# Patient Record
Sex: MALE | Race: WHITE | Employment: OTHER | URBAN - METROPOLITAN AREA
[De-identification: names, ages, dates, MRNs, and addresses within clinical notes are randomized per-mention and may not be internally consistent; named-entity substitution may affect disease eponyms.]

---

## 2021-03-18 ENCOUNTER — TRANSCRIBE ORDER (OUTPATIENT)
Dept: REGISTRATION | Age: 81
End: 2021-03-18

## 2021-03-18 DIAGNOSIS — Z01.812 PRE-PROCEDURE LAB EXAM: Primary | ICD-10-CM

## 2021-03-25 ENCOUNTER — HOSPITAL ENCOUNTER (OUTPATIENT)
Dept: PREADMISSION TESTING | Age: 81
Discharge: HOME | End: 2021-03-25
Payer: MEDICARE

## 2021-03-25 VITALS
WEIGHT: 189.38 LBS | HEART RATE: 51 BPM | SYSTOLIC BLOOD PRESSURE: 163 MMHG | DIASTOLIC BLOOD PRESSURE: 89 MMHG | TEMPERATURE: 97.5 F | BODY MASS INDEX: 25.1 KG/M2 | HEIGHT: 73 IN

## 2021-03-25 LAB
ABO + RH BLD: NORMAL
ANION GAP SERPL CALC-SCNC: 5 MMOL/L (ref 5–15)
APPEARANCE UR: CLEAR
BACTERIA URNS QL MICRO: NEGATIVE /HPF
BILIRUB UR QL: NEGATIVE
BLOOD GROUP ANTIBODIES SERPL: NORMAL
BUN SERPL-MCNC: 25 MG/DL (ref 6–20)
BUN/CREAT SERPL: 19 (ref 12–20)
CALCIUM SERPL-MCNC: 9 MG/DL (ref 8.5–10.1)
CHLORIDE SERPL-SCNC: 106 MMOL/L (ref 97–108)
CO2 SERPL-SCNC: 28 MMOL/L (ref 21–32)
COLOR UR: NORMAL
CREAT SERPL-MCNC: 1.3 MG/DL (ref 0.7–1.3)
EPITH CASTS URNS QL MICRO: NORMAL /LPF
ERYTHROCYTE [DISTWIDTH] IN BLOOD BY AUTOMATED COUNT: 13 % (ref 11.5–14.5)
EST. AVERAGE GLUCOSE BLD GHB EST-MCNC: 108 MG/DL
GLUCOSE SERPL-MCNC: 99 MG/DL (ref 65–100)
GLUCOSE UR STRIP.AUTO-MCNC: NEGATIVE MG/DL
HBA1C MFR BLD: 5.4 % (ref 4–5.6)
HCT VFR BLD AUTO: 44.8 % (ref 36.6–50.3)
HGB BLD-MCNC: 14.5 G/DL (ref 12.1–17)
HGB UR QL STRIP: NEGATIVE
HYALINE CASTS URNS QL MICRO: NORMAL /LPF (ref 0–5)
INR PPP: 1 (ref 0.9–1.1)
KETONES UR QL STRIP.AUTO: NEGATIVE MG/DL
LEUKOCYTE ESTERASE UR QL STRIP.AUTO: NEGATIVE
MCH RBC QN AUTO: 30.2 PG (ref 26–34)
MCHC RBC AUTO-ENTMCNC: 32.4 G/DL (ref 30–36.5)
MCV RBC AUTO: 93.3 FL (ref 80–99)
NITRITE UR QL STRIP.AUTO: NEGATIVE
NRBC # BLD: 0 K/UL (ref 0–0.01)
NRBC BLD-RTO: 0 PER 100 WBC
PH UR STRIP: 5 [PH] (ref 5–8)
PLATELET # BLD AUTO: 241 K/UL (ref 150–400)
PMV BLD AUTO: 10 FL (ref 8.9–12.9)
POTASSIUM SERPL-SCNC: 4.4 MMOL/L (ref 3.5–5.1)
PROT UR STRIP-MCNC: NEGATIVE MG/DL
PROTHROMBIN TIME: 10.6 SEC (ref 9–11.1)
RBC # BLD AUTO: 4.8 M/UL (ref 4.1–5.7)
RBC #/AREA URNS HPF: NORMAL /HPF (ref 0–5)
SODIUM SERPL-SCNC: 139 MMOL/L (ref 136–145)
SP GR UR REFRACTOMETRY: 1.02 (ref 1–1.03)
SPECIMEN EXP DATE BLD: NORMAL
UA: UC IF INDICATED,UAUC: NORMAL
UROBILINOGEN UR QL STRIP.AUTO: 0.2 EU/DL (ref 0.2–1)
WBC # BLD AUTO: 4.4 K/UL (ref 4.1–11.1)
WBC URNS QL MICRO: NORMAL /HPF (ref 0–4)

## 2021-03-25 PROCEDURE — 85027 COMPLETE CBC AUTOMATED: CPT

## 2021-03-25 PROCEDURE — 36415 COLL VENOUS BLD VENIPUNCTURE: CPT

## 2021-03-25 PROCEDURE — 80048 BASIC METABOLIC PNL TOTAL CA: CPT

## 2021-03-25 PROCEDURE — 83036 HEMOGLOBIN GLYCOSYLATED A1C: CPT

## 2021-03-25 PROCEDURE — 81001 URINALYSIS AUTO W/SCOPE: CPT

## 2021-03-25 PROCEDURE — 85610 PROTHROMBIN TIME: CPT

## 2021-03-25 PROCEDURE — 86901 BLOOD TYPING SEROLOGIC RH(D): CPT

## 2021-03-25 RX ORDER — ROSUVASTATIN CALCIUM 20 MG/1
20 TABLET, COATED ORAL DAILY
COMMUNITY

## 2021-03-25 RX ORDER — AMLODIPINE BESYLATE 5 MG/1
5 TABLET ORAL DAILY
COMMUNITY

## 2021-03-25 NOTE — PERIOP NOTES
PATIENT MADE AWARE OF NEED FOR COVID-19 TESTING WITHIN 96 HOURS OF SURGERY. PATIENT INSTRUCTED TO EXPECT A CALL TO SCHEDULE APPT FOR TEST. PATIENT INSTRUCTED TO SELF QUARANTINE BETWEEN TESTING AND ARRIVAL TIME DAY OF SURGERY. Preoperative instructions reviewed with patient. Patient given two bottles of CHG soap. Instructions(reviewed/to be reviewed in class) on use of CHG soap. Patient given SSI infection FAQS sheet, as well as a  MRSA/MSSA treatment instruction sheet  With an explanation to patient that they will be notified if treatment instructions need to be initiated. Patient was given the opportunity to ask questions on the information provided.

## 2021-03-26 LAB
BACTERIA SPEC CULT: NORMAL
BACTERIA SPEC CULT: NORMAL
SERVICE CMNT-IMP: NORMAL

## 2021-03-26 NOTE — PERIOP NOTES
PAT Nurse Practitioner   Pre-Operative Chart Review/Assessment:-ORTHOPEDIC                Patient Name:  Yanet Jay                                                           Age:   [de-identified] y.o.    :  1940     Today's Date:  3/29/2021     Date of PAT:   3/25/2021      Date of Surgery:    2021      Procedure(s):  Left Total Hip Arthroplasty     Surgeon:   Dr. Gonzalo Junior                       PLAN:      1)  Medical Clearance:  Dr. Fortunato Faye      2)  Cardiac Clearance:  Pt followed by Dr. Camille Duffy. LOV 3/19/21. Clearance obtained. OV note and EKG on chart. 3)  Diabetic Treatment Consult:  Not indicated. A1c-5.4      4)  Sleep Apnea evaluation:   Not indicated.  ROBINSON Score 3.       5) Treatment for MRSA/Staph Aureus:  Neg      6) Additional Concerns:  Former smoker, EtOH: 14 drinks/wk, HTN, CAD s/p stent, [place]                Vital Signs:         Vitals:    21 1203   BP: (!) 163/89   Pulse: (!) 51   Temp: 97.5 °F (36.4 °C)   Weight: 85.9 kg (189 lb 6 oz)   Height: 6' 1\" (1.854 m)            ____________________________________________  PAST MEDICAL HISTORY  Past Medical History:   Diagnosis Date    Allergic rhinitis, cause unspecified 3/31/2010    ASCVD (arteriosclerotic cardiovascular disease) 3/31/2010    CAD (coronary artery disease)     Cancer ([facility] [address]     BASAL CELL ON RIGHT EAR    Diverticulosis of colon (without mention of hemorrhage) 3/31/2010    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    Essential hypertension, benign 3/31/2010    Hypercholesterolemia     Other and unspecified hyperlipidemia 3/31/2010    Personal history of colonic polyps 3/31/2010    Personal history of MI (myocardial infarction) 3/31/2010    Stented coronary artery 3/31/2010      ____________________________________________  PAST SURGICAL HISTORY  Past Surgical History:   Procedure Laterality Date    ENDOSCOPY, COLON, DIAGNOSTIC  ,    HX APPENDECTOMY      HX CATARACT REMOVAL Bilateral     HX CORONARY STENT PLACEMENT      HX HEENT      TEETH EXTRACTIONS    HX TONSILLECTOMY      AS A CHILD      ____________________________________________  HOME MEDICATIONS  Current Outpatient Medications   Medication Sig    amLODIPine (NORVASC) 5 mg tablet Take 5 mg by mouth daily.  rosuvastatin (CRESTOR) 20 mg tablet Take 20 mg by mouth daily.  metoprolol-XL (TOPROL XL) 50 mg XL tablet Take 1 Tab by mouth daily.  lisinopril (PRINIVIL, ZESTRIL) 40 mg tablet Take 1 Tab by mouth daily.  hydrochlorothiazide (MICROZIDE) 12.5 mg capsule Take 1 Cap by mouth daily.  tadalafil (CIALIS) 10 mg tablet Take 5 mg by mouth as needed.  aspirin (ASPIRIN) 325 mg tablet Take 325 mg by mouth daily.  nitroglycerin (NITROSTAT) 0.4 mg SL tablet by SubLINGual route every five (5) minutes as needed. No current facility-administered medications for this encounter.       ____________________________________________  ALLERGIES  No Known Allergies   ____________________________________________  SOCIAL HISTORY  Social History     Tobacco Use    Smoking status: Former Smoker     Packs/day: 1.50     Years: 22.00     Pack years: 33.00     Quit date: 1980     Years since quittin.8    Smokeless tobacco: Never Used   Substance Use Topics    Alcohol use:  Yes     Alcohol/week: 14.0 standard drinks     Types: 14 Shots of liquor per week      ____________________________________________        Labs:     Hospital Outpatient Visit on 2021   Component Date Value Ref Range Status    Sodium 2021 139  136 - 145 mmol/L Final    Potassium 2021 4.4  3.5 - 5.1 mmol/L Final    Chloride 2021 106  97 - 108 mmol/L Final    CO2 2021 28  21 - 32 mmol/L Final    Anion gap 2021 5  5 - 15 mmol/L Final    Glucose 2021 99  65 - 100 mg/dL Final    BUN 2021 25* 6 - 20 MG/DL Final    Creatinine 2021 1.30  0.70 - 1.30 MG/DL Final    BUN/Creatinine ratio 2021 19  12 - 20   Final    GFR est AA 03/25/2021 >60  >60 ml/min/1.73m2 Final    GFR est non-AA 03/25/2021 53* >60 ml/min/1.73m2 Final    Estimated GFR is calculated using the IDMS-traceable Modification of Diet in Renal Disease (MDRD) Study equation, reported for both  Americans (GFRAA) and non- Americans (GFRNA), and normalized to 1.73m2 body surface area. The physician must decide which value applies to the patient.  Calcium 03/25/2021 9.0  8.5 - 10.1 MG/DL Final    WBC 03/25/2021 4.4  4.1 - 11.1 K/uL Final    RBC 03/25/2021 4.80  4. 10 - 5.70 M/uL Final    HGB 03/25/2021 14.5  12.1 - 17.0 g/dL Final    HCT 03/25/2021 44.8  36.6 - 50.3 % Final    MCV 03/25/2021 93.3  80.0 - 99.0 FL Final    MCH 03/25/2021 30.2  26.0 - 34.0 PG Final    MCHC 03/25/2021 32.4  30.0 - 36.5 g/dL Final    RDW 03/25/2021 13.0  11.5 - 14.5 % Final    PLATELET 96/55/2282 628  150 - 400 K/uL Final    MPV 03/25/2021 10.0  8.9 - 12.9 FL Final    NRBC 03/25/2021 0.0  0  WBC Final    ABSOLUTE NRBC 03/25/2021 0.00  0.00 - 0.01 K/uL Final    Crossmatch Expiration 03/25/2021 04/04/2021,2359   Final    ABO/Rh(D) 03/25/2021 AB POSITIVE   Final    Antibody screen 03/25/2021 NEG   Final    INR 03/25/2021 1.0  0.9 - 1.1   Final    A single therapeutic range for Vit K antagonists may not be optimal for all indications - see June, 2008 issue of Chest, American College of Chest Physicians Evidence-Based Clinical Practice Guidelines, 8th Edition.     Prothrombin time 03/25/2021 10.6  9.0 - 11.1 sec Final    Color 03/25/2021 YELLOW/STRAW    Final    Color Reference Range: Straw, Yellow or Dark Yellow    Appearance 03/25/2021 CLEAR  CLEAR   Final    Specific gravity 03/25/2021 1.016  1.003 - 1.030   Final    pH (UA) 03/25/2021 5.0  5.0 - 8.0   Final    Protein 03/25/2021 Negative  NEG mg/dL Final    Glucose 03/25/2021 Negative  NEG mg/dL Final    Ketone 03/25/2021 Negative  NEG mg/dL Final    Bilirubin 03/25/2021 Negative  NEG   Final    Blood 03/25/2021 Negative  NEG   Final    Urobilinogen 03/25/2021 0.2  0.2 - 1.0 EU/dL Final    Nitrites 03/25/2021 Negative  NEG   Final    Leukocyte Esterase 03/25/2021 Negative  NEG   Final    UA:UC IF INDICATED 03/25/2021 CULTURE NOT INDICATED BY UA RESULT  CNI   Final    WBC 03/25/2021 0-4  0 - 4 /hpf Final    RBC 03/25/2021 0-5  0 - 5 /hpf Final    Epithelial cells 03/25/2021 FEW  FEW /lpf Final    Epithelial cell category consists of squamous cells and /or transitional urothelial cells. Renal tubular cells, if present, are separately identified as such.  Bacteria 03/25/2021 Negative  NEG /hpf Final    Hyaline cast 03/25/2021 0-2  0 - 5 /lpf Final    Hemoglobin A1c 03/25/2021 5.4  4.0 - 5.6 % Final    Comment: NEW METHOD  PLEASE NOTE NEW REFERENCE RANGE  (NOTE)  HbA1C Interpretive Ranges  <5.7              Normal  5.7 - 6.4         Consider Prediabetes  >6.5              Consider Diabetes      Est. average glucose 03/25/2021 108  mg/dL Final    Special Requests: 03/25/2021 NO SPECIAL REQUESTS    Final    Culture result: 03/25/2021 MRSA NOT PRESENT    Final       Skin:     Denies open wounds, cuts, sores, rashes or other areas of concern in PAT assessment.           Db Cao NP

## 2021-03-28 ENCOUNTER — HOSPITAL ENCOUNTER (OUTPATIENT)
Dept: PREADMISSION TESTING | Age: 81
Discharge: HOME | End: 2021-03-28
Payer: MEDICARE

## 2021-03-28 DIAGNOSIS — Z01.812 PRE-PROCEDURE LAB EXAM: ICD-10-CM

## 2021-03-28 PROCEDURE — U0003 INFECTIOUS AGENT DETECTION BY NUCLEIC ACID (DNA OR RNA); SEVERE ACUTE RESPIRATORY SYNDROME CORONAVIRUS 2 (SARS-COV-2) (CORONAVIRUS DISEASE [COVID-19]), AMPLIFIED PROBE TECHNIQUE, MAKING USE OF HIGH THROUGHPUT TECHNOLOGIES AS DESCRIBED BY CMS-2020-01-R: HCPCS

## 2021-03-29 LAB — SARS-COV-2, COV2NT: NOT DETECTED

## 2021-03-30 RX ORDER — ACETAMINOPHEN 500 MG
1000 TABLET ORAL ONCE
Status: CANCELLED | OUTPATIENT
Start: 2021-03-30 | End: 2021-03-30

## 2021-03-30 RX ORDER — CELECOXIB 200 MG/1
200 CAPSULE ORAL ONCE
Status: CANCELLED | OUTPATIENT
Start: 2021-03-30 | End: 2021-03-30

## 2021-04-01 ENCOUNTER — APPOINTMENT (OUTPATIENT)
Dept: GENERAL RADIOLOGY | Age: 81
End: 2021-04-01
Attending: PHYSICIAN ASSISTANT
Payer: MEDICARE

## 2021-04-01 ENCOUNTER — HOSPITAL ENCOUNTER (OUTPATIENT)
Age: 81
Discharge: HOME HEALTH | End: 2021-04-02
Attending: ORTHOPAEDIC SURGERY | Admitting: ORTHOPAEDIC SURGERY
Payer: MEDICARE

## 2021-04-01 ENCOUNTER — ANESTHESIA (OUTPATIENT)
Dept: SURGERY | Age: 81
End: 2021-04-01
Payer: MEDICARE

## 2021-04-01 ENCOUNTER — ANESTHESIA EVENT (OUTPATIENT)
Dept: SURGERY | Age: 81
End: 2021-04-01
Payer: MEDICARE

## 2021-04-01 DIAGNOSIS — M16.12 PRIMARY OSTEOARTHRITIS OF LEFT HIP: Primary | ICD-10-CM

## 2021-04-01 LAB
GLUCOSE BLD STRIP.AUTO-MCNC: 101 MG/DL (ref 65–100)
SERVICE CMNT-IMP: ABNORMAL

## 2021-04-01 PROCEDURE — 74011000250 HC RX REV CODE- 250: Performed by: PHYSICIAN ASSISTANT

## 2021-04-01 PROCEDURE — 77030031139 HC SUT VCRL2 J&J -A: Performed by: ORTHOPAEDIC SURGERY

## 2021-04-01 PROCEDURE — 76060000036 HC ANESTHESIA 2.5 TO 3 HR: Performed by: ORTHOPAEDIC SURGERY

## 2021-04-01 PROCEDURE — 77030003882 HC BIT DRL TWST BRSM -B: Performed by: ORTHOPAEDIC SURGERY

## 2021-04-01 PROCEDURE — 77030040361 HC SLV COMPR DVT MDII -B

## 2021-04-01 PROCEDURE — 77030040922 HC BLNKT HYPOTHRM STRY -A

## 2021-04-01 PROCEDURE — 74011000250 HC RX REV CODE- 250: Performed by: NURSE ANESTHETIST, CERTIFIED REGISTERED

## 2021-04-01 PROCEDURE — 77030013079 HC BLNKT BAIR HGGR 3M -A: Performed by: ANESTHESIOLOGY

## 2021-04-01 PROCEDURE — 2709999900 HC NON-CHARGEABLE SUPPLY: Performed by: ORTHOPAEDIC SURGERY

## 2021-04-01 PROCEDURE — 77030006822 HC BLD SAW SAG BRSM -B: Performed by: ORTHOPAEDIC SURGERY

## 2021-04-01 PROCEDURE — 74011250637 HC RX REV CODE- 250/637: Performed by: PHYSICIAN ASSISTANT

## 2021-04-01 PROCEDURE — 77030010507 HC ADH SKN DERMBND J&J -B: Performed by: ORTHOPAEDIC SURGERY

## 2021-04-01 PROCEDURE — 77030041397 HC DRSG PRIMASEAL AG MDII -B: Performed by: ORTHOPAEDIC SURGERY

## 2021-04-01 PROCEDURE — 77030005513 HC CATH URETH FOL11 MDII -B: Performed by: ORTHOPAEDIC SURGERY

## 2021-04-01 PROCEDURE — 77030002933 HC SUT MCRYL J&J -A: Performed by: ORTHOPAEDIC SURGERY

## 2021-04-01 PROCEDURE — 73501 X-RAY EXAM HIP UNI 1 VIEW: CPT

## 2021-04-01 PROCEDURE — 77030018673: Performed by: ORTHOPAEDIC SURGERY

## 2021-04-01 PROCEDURE — 82962 GLUCOSE BLOOD TEST: CPT

## 2021-04-01 PROCEDURE — 74011250637 HC RX REV CODE- 250/637: Performed by: ANESTHESIOLOGY

## 2021-04-01 PROCEDURE — 77030033067 HC SUT PDO STRATFX SPIR J&J -B: Performed by: ORTHOPAEDIC SURGERY

## 2021-04-01 PROCEDURE — 76010000171 HC OR TIME 2 TO 2.5 HR INTENSV-TIER 1: Performed by: ORTHOPAEDIC SURGERY

## 2021-04-01 PROCEDURE — C1776 JOINT DEVICE (IMPLANTABLE): HCPCS | Performed by: ORTHOPAEDIC SURGERY

## 2021-04-01 PROCEDURE — 74011250636 HC RX REV CODE- 250/636: Performed by: ANESTHESIOLOGY

## 2021-04-01 PROCEDURE — 76210000016 HC OR PH I REC 1 TO 1.5 HR: Performed by: ORTHOPAEDIC SURGERY

## 2021-04-01 PROCEDURE — 76210000020 HC REC RM PH II FIRST 0.5 HR: Performed by: ORTHOPAEDIC SURGERY

## 2021-04-01 PROCEDURE — 77030018723 HC ELCTRD BLD COVD -A: Performed by: ORTHOPAEDIC SURGERY

## 2021-04-01 PROCEDURE — 77030018547 HC SUT ETHBND1 J&J -B: Performed by: ORTHOPAEDIC SURGERY

## 2021-04-01 PROCEDURE — 74011250636 HC RX REV CODE- 250/636: Performed by: NURSE ANESTHETIST, CERTIFIED REGISTERED

## 2021-04-01 PROCEDURE — 74011000250 HC RX REV CODE- 250: Performed by: ORTHOPAEDIC SURGERY

## 2021-04-01 PROCEDURE — 77030018074 HC RTVR SUT ARTH4 S&N -B: Performed by: ORTHOPAEDIC SURGERY

## 2021-04-01 PROCEDURE — 74011250636 HC RX REV CODE- 250/636: Performed by: PHYSICIAN ASSISTANT

## 2021-04-01 PROCEDURE — 77030007866 HC KT SPN ANES BBMI -B: Performed by: ANESTHESIOLOGY

## 2021-04-01 DEVICE — TAPERFILL HIP STEM, LATERAL OFFSET, SIZE 14
Type: IMPLANTABLE DEVICE | Site: HIP | Status: FUNCTIONAL
Brand: DJO SURGICAL

## 2021-04-01 DEVICE — HEAD, FEMORAL, CERAMIC, BILOX DELTA, 40MM +4.0
Type: IMPLANTABLE DEVICE | Site: HIP | Status: FUNCTIONAL
Brand: DJO SURGICAL

## 2021-04-01 DEVICE — EMPOWR ACETABULAR, BONE SCREW, 30MM
Type: IMPLANTABLE DEVICE | Site: HIP | Status: FUNCTIONAL
Brand: DJO SURGICAL

## 2021-04-01 DEVICE — EMPOWR ACETABULAR SYSTEM, LINER, NEUTRAL, HXE+, 40I
Type: IMPLANTABLE DEVICE | Site: HIP | Status: FUNCTIONAL
Brand: DJO SURGICAL

## 2021-04-01 DEVICE — EMPOWR ACET SYSTEM, CUP, HEMISPHERICAL, CLUSTER HOLE, 60MM
Type: IMPLANTABLE DEVICE | Site: HIP | Status: FUNCTIONAL
Brand: DJO SURGICAL

## 2021-04-01 RX ORDER — PROPOFOL 10 MG/ML
INJECTION, EMULSION INTRAVENOUS
Status: DISCONTINUED | OUTPATIENT
Start: 2021-04-01 | End: 2021-04-01 | Stop reason: HOSPADM

## 2021-04-01 RX ORDER — SODIUM CHLORIDE 0.9 % (FLUSH) 0.9 %
5-40 SYRINGE (ML) INJECTION EVERY 8 HOURS
Status: DISCONTINUED | OUTPATIENT
Start: 2021-04-01 | End: 2021-04-01 | Stop reason: HOSPADM

## 2021-04-01 RX ORDER — SODIUM CHLORIDE, SODIUM LACTATE, POTASSIUM CHLORIDE, CALCIUM CHLORIDE 600; 310; 30; 20 MG/100ML; MG/100ML; MG/100ML; MG/100ML
INJECTION, SOLUTION INTRAVENOUS
Status: DISCONTINUED | OUTPATIENT
Start: 2021-04-01 | End: 2021-04-01 | Stop reason: HOSPADM

## 2021-04-01 RX ORDER — ACETAMINOPHEN 500 MG
500 TABLET ORAL EVERY 4 HOURS
Qty: 100 TAB | Refills: 0 | Status: SHIPPED
Start: 2021-04-01

## 2021-04-01 RX ORDER — TRANEXAMIC ACID 100 MG/ML
INJECTION, SOLUTION INTRAVENOUS AS NEEDED
Status: DISCONTINUED | OUTPATIENT
Start: 2021-04-01 | End: 2021-04-01 | Stop reason: HOSPADM

## 2021-04-01 RX ORDER — FENTANYL CITRATE 50 UG/ML
25 INJECTION, SOLUTION INTRAMUSCULAR; INTRAVENOUS
Status: DISCONTINUED | OUTPATIENT
Start: 2021-04-01 | End: 2021-04-01 | Stop reason: HOSPADM

## 2021-04-01 RX ORDER — AMOXICILLIN 250 MG
1 CAPSULE ORAL
Qty: 60 TAB | Refills: 0 | Status: SHIPPED | OUTPATIENT
Start: 2021-04-01

## 2021-04-01 RX ORDER — SODIUM CHLORIDE 9 MG/ML
25 INJECTION, SOLUTION INTRAVENOUS CONTINUOUS
Status: DISCONTINUED | OUTPATIENT
Start: 2021-04-01 | End: 2021-04-01 | Stop reason: HOSPADM

## 2021-04-01 RX ORDER — FENTANYL CITRATE 50 UG/ML
50 INJECTION, SOLUTION INTRAMUSCULAR; INTRAVENOUS AS NEEDED
Status: DISCONTINUED | OUTPATIENT
Start: 2021-04-01 | End: 2021-04-01 | Stop reason: HOSPADM

## 2021-04-01 RX ORDER — FACIAL-BODY WIPES
10 EACH TOPICAL DAILY PRN
Status: DISCONTINUED | OUTPATIENT
Start: 2021-04-03 | End: 2021-04-02 | Stop reason: HOSPADM

## 2021-04-01 RX ORDER — MIDAZOLAM HYDROCHLORIDE 1 MG/ML
1 INJECTION, SOLUTION INTRAMUSCULAR; INTRAVENOUS AS NEEDED
Status: DISCONTINUED | OUTPATIENT
Start: 2021-04-01 | End: 2021-04-01 | Stop reason: HOSPADM

## 2021-04-01 RX ORDER — SODIUM CHLORIDE 0.9 % (FLUSH) 0.9 %
5-40 SYRINGE (ML) INJECTION EVERY 8 HOURS
Status: DISCONTINUED | OUTPATIENT
Start: 2021-04-01 | End: 2021-04-02 | Stop reason: HOSPADM

## 2021-04-01 RX ORDER — NALOXONE HYDROCHLORIDE 0.4 MG/ML
0.4 INJECTION, SOLUTION INTRAMUSCULAR; INTRAVENOUS; SUBCUTANEOUS AS NEEDED
Status: DISCONTINUED | OUTPATIENT
Start: 2021-04-01 | End: 2021-04-02 | Stop reason: HOSPADM

## 2021-04-01 RX ORDER — MIDAZOLAM HYDROCHLORIDE 1 MG/ML
0.5 INJECTION, SOLUTION INTRAMUSCULAR; INTRAVENOUS
Status: DISCONTINUED | OUTPATIENT
Start: 2021-04-01 | End: 2021-04-01 | Stop reason: HOSPADM

## 2021-04-01 RX ORDER — OXYCODONE AND ACETAMINOPHEN 5; 325 MG/1; MG/1
1 TABLET ORAL AS NEEDED
Status: DISCONTINUED | OUTPATIENT
Start: 2021-04-01 | End: 2021-04-01 | Stop reason: HOSPADM

## 2021-04-01 RX ORDER — ONDANSETRON 2 MG/ML
4 INJECTION INTRAMUSCULAR; INTRAVENOUS
Status: DISCONTINUED | OUTPATIENT
Start: 2021-04-01 | End: 2021-04-02 | Stop reason: HOSPADM

## 2021-04-01 RX ORDER — ACETAMINOPHEN 500 MG
1000 TABLET ORAL ONCE
Status: DISCONTINUED | OUTPATIENT
Start: 2021-04-01 | End: 2021-04-01 | Stop reason: HOSPADM

## 2021-04-01 RX ORDER — ONDANSETRON 2 MG/ML
4 INJECTION INTRAMUSCULAR; INTRAVENOUS AS NEEDED
Status: DISCONTINUED | OUTPATIENT
Start: 2021-04-01 | End: 2021-04-01 | Stop reason: HOSPADM

## 2021-04-01 RX ORDER — EPHEDRINE SULFATE/0.9% NACL/PF 50 MG/5 ML
SYRINGE (ML) INTRAVENOUS AS NEEDED
Status: DISCONTINUED | OUTPATIENT
Start: 2021-04-01 | End: 2021-04-01 | Stop reason: HOSPADM

## 2021-04-01 RX ORDER — KETOROLAC TROMETHAMINE 30 MG/ML
15 INJECTION, SOLUTION INTRAMUSCULAR; INTRAVENOUS EVERY 6 HOURS
Status: COMPLETED | OUTPATIENT
Start: 2021-04-01 | End: 2021-04-02

## 2021-04-01 RX ORDER — OXYCODONE HYDROCHLORIDE 5 MG/1
2.5 TABLET ORAL
Status: DISCONTINUED | OUTPATIENT
Start: 2021-04-01 | End: 2021-04-02 | Stop reason: HOSPADM

## 2021-04-01 RX ORDER — LISINOPRIL 10 MG/1
40 TABLET ORAL DAILY
Status: DISCONTINUED | OUTPATIENT
Start: 2021-04-02 | End: 2021-04-02 | Stop reason: HOSPADM

## 2021-04-01 RX ORDER — ROSUVASTATIN CALCIUM 10 MG/1
20 TABLET, COATED ORAL DAILY
Status: DISCONTINUED | OUTPATIENT
Start: 2021-04-02 | End: 2021-04-02 | Stop reason: HOSPADM

## 2021-04-01 RX ORDER — ACETAMINOPHEN 325 MG/1
650 TABLET ORAL ONCE
Status: COMPLETED | OUTPATIENT
Start: 2021-04-01 | End: 2021-04-01

## 2021-04-01 RX ORDER — ASPIRIN 81 MG/1
81 TABLET ORAL EVERY 12 HOURS
Status: DISCONTINUED | OUTPATIENT
Start: 2021-04-01 | End: 2021-04-02 | Stop reason: HOSPADM

## 2021-04-01 RX ORDER — METOPROLOL SUCCINATE 50 MG/1
50 TABLET, EXTENDED RELEASE ORAL DAILY
Status: DISCONTINUED | OUTPATIENT
Start: 2021-04-02 | End: 2021-04-02 | Stop reason: HOSPADM

## 2021-04-01 RX ORDER — LIDOCAINE HYDROCHLORIDE 10 MG/ML
0.1 INJECTION, SOLUTION EPIDURAL; INFILTRATION; INTRACAUDAL; PERINEURAL AS NEEDED
Status: DISCONTINUED | OUTPATIENT
Start: 2021-04-01 | End: 2021-04-01 | Stop reason: HOSPADM

## 2021-04-01 RX ORDER — MORPHINE SULFATE 2 MG/ML
2 INJECTION, SOLUTION INTRAMUSCULAR; INTRAVENOUS
Status: DISCONTINUED | OUTPATIENT
Start: 2021-04-01 | End: 2021-04-01 | Stop reason: HOSPADM

## 2021-04-01 RX ORDER — HYDROMORPHONE HYDROCHLORIDE 1 MG/ML
0.5 INJECTION, SOLUTION INTRAMUSCULAR; INTRAVENOUS; SUBCUTANEOUS
Status: DISCONTINUED | OUTPATIENT
Start: 2021-04-01 | End: 2021-04-02 | Stop reason: HOSPADM

## 2021-04-01 RX ORDER — DIPHENHYDRAMINE HYDROCHLORIDE 50 MG/ML
12.5 INJECTION, SOLUTION INTRAMUSCULAR; INTRAVENOUS AS NEEDED
Status: DISCONTINUED | OUTPATIENT
Start: 2021-04-01 | End: 2021-04-01 | Stop reason: HOSPADM

## 2021-04-01 RX ORDER — AMLODIPINE BESYLATE 5 MG/1
5 TABLET ORAL DAILY
Status: DISCONTINUED | OUTPATIENT
Start: 2021-04-02 | End: 2021-04-02 | Stop reason: HOSPADM

## 2021-04-01 RX ORDER — ASPIRIN 81 MG/1
81 TABLET ORAL 2 TIMES DAILY
Qty: 60 TAB | Refills: 0 | Status: SHIPPED | OUTPATIENT
Start: 2021-04-01

## 2021-04-01 RX ORDER — POLYETHYLENE GLYCOL 3350 17 G/17G
17 POWDER, FOR SOLUTION ORAL DAILY
Status: DISCONTINUED | OUTPATIENT
Start: 2021-04-02 | End: 2021-04-02 | Stop reason: HOSPADM

## 2021-04-01 RX ORDER — SODIUM CHLORIDE 0.9 % (FLUSH) 0.9 %
5-40 SYRINGE (ML) INJECTION AS NEEDED
Status: DISCONTINUED | OUTPATIENT
Start: 2021-04-01 | End: 2021-04-01 | Stop reason: HOSPADM

## 2021-04-01 RX ORDER — SODIUM CHLORIDE, SODIUM LACTATE, POTASSIUM CHLORIDE, CALCIUM CHLORIDE 600; 310; 30; 20 MG/100ML; MG/100ML; MG/100ML; MG/100ML
125 INJECTION, SOLUTION INTRAVENOUS CONTINUOUS
Status: DISCONTINUED | OUTPATIENT
Start: 2021-04-01 | End: 2021-04-01 | Stop reason: HOSPADM

## 2021-04-01 RX ORDER — HYDROMORPHONE HYDROCHLORIDE 1 MG/ML
0.2 INJECTION, SOLUTION INTRAMUSCULAR; INTRAVENOUS; SUBCUTANEOUS
Status: DISCONTINUED | OUTPATIENT
Start: 2021-04-01 | End: 2021-04-01 | Stop reason: HOSPADM

## 2021-04-01 RX ORDER — PROPOFOL 10 MG/ML
INJECTION, EMULSION INTRAVENOUS AS NEEDED
Status: DISCONTINUED | OUTPATIENT
Start: 2021-04-01 | End: 2021-04-01 | Stop reason: HOSPADM

## 2021-04-01 RX ORDER — CELECOXIB 200 MG/1
200 CAPSULE ORAL ONCE
Status: DISCONTINUED | OUTPATIENT
Start: 2021-04-01 | End: 2021-04-01 | Stop reason: HOSPADM

## 2021-04-01 RX ORDER — MIDAZOLAM HYDROCHLORIDE 1 MG/ML
INJECTION, SOLUTION INTRAMUSCULAR; INTRAVENOUS AS NEEDED
Status: DISCONTINUED | OUTPATIENT
Start: 2021-04-01 | End: 2021-04-01 | Stop reason: HOSPADM

## 2021-04-01 RX ORDER — CEFAZOLIN SODIUM 1 G/3ML
INJECTION, POWDER, FOR SOLUTION INTRAMUSCULAR; INTRAVENOUS AS NEEDED
Status: DISCONTINUED | OUTPATIENT
Start: 2021-04-01 | End: 2021-04-01 | Stop reason: HOSPADM

## 2021-04-01 RX ORDER — SODIUM CHLORIDE 0.9 % (FLUSH) 0.9 %
5-40 SYRINGE (ML) INJECTION AS NEEDED
Status: DISCONTINUED | OUTPATIENT
Start: 2021-04-01 | End: 2021-04-02 | Stop reason: HOSPADM

## 2021-04-01 RX ORDER — OXYCODONE HYDROCHLORIDE 5 MG/1
2.5-5 TABLET ORAL
Qty: 42 TAB | Refills: 0 | Status: SHIPPED | OUTPATIENT
Start: 2021-04-01 | End: 2021-04-08

## 2021-04-01 RX ORDER — SODIUM CHLORIDE 9 MG/ML
125 INJECTION, SOLUTION INTRAVENOUS CONTINUOUS
Status: DISCONTINUED | OUTPATIENT
Start: 2021-04-01 | End: 2021-04-02 | Stop reason: HOSPADM

## 2021-04-01 RX ORDER — OXYCODONE HYDROCHLORIDE 5 MG/1
5 TABLET ORAL
Status: DISCONTINUED | OUTPATIENT
Start: 2021-04-01 | End: 2021-04-02 | Stop reason: HOSPADM

## 2021-04-01 RX ORDER — HYDROXYZINE HYDROCHLORIDE 10 MG/1
10 TABLET, FILM COATED ORAL
Status: DISCONTINUED | OUTPATIENT
Start: 2021-04-01 | End: 2021-04-02 | Stop reason: HOSPADM

## 2021-04-01 RX ORDER — AMOXICILLIN 250 MG
1 CAPSULE ORAL 2 TIMES DAILY
Status: DISCONTINUED | OUTPATIENT
Start: 2021-04-01 | End: 2021-04-02 | Stop reason: HOSPADM

## 2021-04-01 RX ORDER — ACETAMINOPHEN 500 MG
500 TABLET ORAL
Status: DISCONTINUED | OUTPATIENT
Start: 2021-04-01 | End: 2021-04-02 | Stop reason: HOSPADM

## 2021-04-01 RX ADMIN — SODIUM CHLORIDE, POTASSIUM CHLORIDE, SODIUM LACTATE AND CALCIUM CHLORIDE: 600; 310; 30; 20 INJECTION, SOLUTION INTRAVENOUS at 10:23

## 2021-04-01 RX ADMIN — ASPIRIN 81 MG: 81 TABLET, COATED ORAL at 18:19

## 2021-04-01 RX ADMIN — ACETAMINOPHEN 500 MG: 500 TABLET ORAL at 21:31

## 2021-04-01 RX ADMIN — PROPOFOL 40 MG: 10 INJECTION, EMULSION INTRAVENOUS at 11:08

## 2021-04-01 RX ADMIN — PROPOFOL 40 MG: 10 INJECTION, EMULSION INTRAVENOUS at 10:50

## 2021-04-01 RX ADMIN — PROPOFOL 30 MG: 10 INJECTION, EMULSION INTRAVENOUS at 10:24

## 2021-04-01 RX ADMIN — MIDAZOLAM 2 MG: 1 INJECTION INTRAMUSCULAR; INTRAVENOUS at 10:24

## 2021-04-01 RX ADMIN — Medication 10 MG: at 11:37

## 2021-04-01 RX ADMIN — SODIUM CHLORIDE, POTASSIUM CHLORIDE, SODIUM LACTATE AND CALCIUM CHLORIDE 125 ML/HR: 600; 310; 30; 20 INJECTION, SOLUTION INTRAVENOUS at 10:08

## 2021-04-01 RX ADMIN — Medication 10 MG: at 11:25

## 2021-04-01 RX ADMIN — PROPOFOL 65 MCG/KG/MIN: 10 INJECTION, EMULSION INTRAVENOUS at 10:31

## 2021-04-01 RX ADMIN — Medication 10 MG: at 11:08

## 2021-04-01 RX ADMIN — Medication 10 MG: at 10:51

## 2021-04-01 RX ADMIN — PROPOFOL 30 MG: 10 INJECTION, EMULSION INTRAVENOUS at 10:53

## 2021-04-01 RX ADMIN — Medication 10 MG: at 10:58

## 2021-04-01 RX ADMIN — KETOROLAC TROMETHAMINE 15 MG: 30 INJECTION, SOLUTION INTRAMUSCULAR at 18:19

## 2021-04-01 RX ADMIN — ACETAMINOPHEN 650 MG: 325 TABLET ORAL at 10:03

## 2021-04-01 RX ADMIN — SODIUM CHLORIDE 125 ML/HR: 9 INJECTION, SOLUTION INTRAVENOUS at 13:31

## 2021-04-01 RX ADMIN — CEFAZOLIN 2 G: 330 INJECTION, POWDER, FOR SOLUTION INTRAMUSCULAR; INTRAVENOUS at 10:51

## 2021-04-01 RX ADMIN — CEFAZOLIN SODIUM 2 G: 1 INJECTION, POWDER, FOR SOLUTION INTRAMUSCULAR; INTRAVENOUS at 18:18

## 2021-04-01 RX ADMIN — DOCUSATE SODIUM 50 MG AND SENNOSIDES 8.6 MG 1 TABLET: 8.6; 5 TABLET, FILM COATED ORAL at 18:19

## 2021-04-01 RX ADMIN — ACETAMINOPHEN 500 MG: 500 TABLET ORAL at 18:19

## 2021-04-01 NOTE — BRIEF OP NOTE
Brief Postoperative Note    Patient: Hebert Lindo  YOB: 1940  MRN: [id number]    Date of Procedure: 4/1/2021     Pre-Op Diagnosis: OSTEOARTHRITIS  LEFT HIP    Post-Op Diagnosis: Same as preoperative diagnosis. Procedure(s):  LEFT TOTAL HIP ARTHROPLASTY (BLOCK)    Surgeon(s):  Susanne Iyer MD    Surgical Assistant: Physician Assistant: Mykel Werner PA-C  Surg Asst-1: Norman Vaughn    Anesthesia: Spinal     Estimated Blood Loss (mL): 779     Complications: None    Specimens: * No specimens in log *     Implants:   Implant Name Type Inv.  Item Serial No.  Lot No. LRB No. Used Action   CUP ACET CLUS HOLE I 60 MM W/ DOME HOLE PLUG P2 COAT EMPOWR - SNA  CUP ACET CLUS HOLE I 60 MM W/ DOME HOLE PLUG P2 COAT EMPOWR NA [facility] [employer] [facility] SURGICAL_ [id number] Left 1 Implanted   LINER ACET NEUT I 40X61 MM HIP HXE+ VIT E POLYETH EMPOWR - SNA  LINER ACET NEUT I 40X61 MM HIP HXE+ VIT E POLYETH EMPOWR NA [facility] - [facility] SURGICAL_ [id number] Left 1 Implanted   STEM FEM 14 HIP LAT OFFSET TAPERFILL - SNA  STEM FEM 14 HIP LAT OFFSET TAPERFILL NA [facility] [employer] - O SURGICAL_ [id number] Left 1 Implanted   SCREW BNE 30 MM ACET EMPOWR - SNA  SCREW BNE 30 MM ACET EMPOWR NA [facility] MEDICAL - O SURGICAL_ [id number] Left 1 Implanted   HEAD FEM 4+ MM 40 MM HIP OFFSET FOR FMP SYS BIOLOX DELT CERM - SNA  HEAD FEM 4+ MM 40 MM HIP OFFSET FOR FMP SYS BIOLOX DELT CERM NA [facility] [employer] Nancie Casarez SURGICAL_ [id number] Left 1 Implanted       Drains: * No LDAs found *    Findings: oa left hip    Electronically Signed by Kimberly Morgan PA-C on 4/1/2021 at 12:21 PM

## 2021-04-01 NOTE — DISCHARGE INSTRUCTIONS
Post-op Discharge Instructions Following Total Joint Replacement  Lara Hewitt MD  [address]  [phone], ext [phone]  Follow-up Office Visit   See Dr. Esperanza Hitchcock approximately 3-4 weeks from date of surgery. Call [phone], ext [phone] to make an appointment. Activity   Use your walker for ambulation. Weight bearing as tolerated unless instructed otherwise by the physical therapist. Get up every hour you are awake and take a brief walk. Lengthen walking distance daily as your strength improves.  Continue using your walker until seen in the office for your first follow up visit.  Practice your exercises 3 times daily as instructed by the physical therapist. Trenna Bread for 20 minutes after exercising.  No driving until seen in the office for your first follow up visit. Incision Care   The light brown Aquacel surgical dressing is waterproof and is to remain on your incision for 7 days. On the 7th day, carefully lift the edge of the dressing to break the adhesive seal and gently peel it off.  If your Aquacel dressings comes loose or falls off before the 7th day, replace it with a dry sterile gauze dressing and change this dressing daily. Once there is no drainage on the bandage, you mean leave the incision open to air.  You may take a shower with the Aquacel dressing in place. After you remove the Aquacel dressing on day 7, you may continue to shower and get your incision wet in the shower. Do not submerge your incision under water in a bathtub, hot tub, swimming pool, etc. until after you have been evaluated at your first office visit. Medications   Blood Clot Prevention: Take medication as prescribed by your physician for 4 weeks postop.  Pain Management: Take pain medication as prescribed; wean yourself off of pain medication as your pain lessens. Take with food.  You make also take Tylenol every 4-6 hours as needed for pain. Do not exceed 3 grams (3000mg) per day.    Place an ice bag on or around the incision for 20 minutes on / 20 minutes off as needed throughout the day and night, especially after exercising.  Stool Softener: You may want to take a stool softener (such as Senokot-S or Colace) to prevent constipation while taking pain medication. If constipation occurs, you may also use a laxative (such as Dulcolax tablets, Miralax, or a suppository). Diet   Resume usual diet at home. Drink plenty of fluids. Eat foods high in fiber and protein. Calcium and Vitamin D supplements recommended. Avoid alcoholic beverages. No smoking. When to call your Orthopaedic Surgeon: If you call after 5pm or on a weekend, the on call physician will return your call   Pain that is not relieved by pain medication, ice, and activity modification   Signs of infection (red incision, continuous drainage from the incision, malodorous drainage, persistent fever greater than 101 degrees Fahrenheit)   Signs of a blood clot in your leg (calf pain, tenderness, redness, and/or swelling of the lower leg)  ?   When to call your Primary Care Physician   Concerns about your medical conditions such as diabetes, high blood pressure, asthma, congestive heart failure   Call if blood sugars are elevated, if you have a persistent headache or dizziness, coughing or congestion, constipation or diarrhea, burning with urination, abnormal heart rate (fast or slow)  When to call 911 and go to the nearest Emergency Room   Acute onset of chest pain, shortness of breath, difficulty breathing

## 2021-04-01 NOTE — ANESTHESIA POSTPROCEDURE EVALUATION
Procedure(s):  LEFT TOTAL HIP ARTHROPLASTY (BLOCK). spinal    Anesthesia Post Evaluation        Patient participation: complete - patient participated  Level of consciousness: awake  Pain management: adequate  Airway patency: patent  Anesthetic complications: no  Cardiovascular status: hemodynamically stable  Respiratory status: acceptable  Hydration status: acceptable  Comments: The patient is ready for PACU discharge. Tierney Blanca DO                   Post anesthesia nausea and vomiting:  controlled      INITIAL Post-op Vital signs:   Vitals Value Taken Time   /57 04/01/21 1300   Temp     Pulse 56 04/01/21 1303   Resp 17 04/01/21 1303   SpO2 100 % 04/01/21 1303   Vitals shown include unvalidated device data.

## 2021-04-01 NOTE — DISCHARGE SUMMARY
[address]     DISCHARGE SUMMARY     Name: Sulaiman Zamora       MR#: [id number]    : 1940  ADMIT DATE: 2021  DISCHARGE DATE: 2021     ADMISSION DIAGNOSIS: Primary localized osteoarthritis of left hip [M16.12]     DISCHARGE DIAGNOSIS: OSTEOARTHRITIS  LEFT HIP     PROCEDURE PERFORMED: Procedure(s):  LEFT TOTAL HIP ARTHROPLASTY (BLOCK)     CONSULTATIONS:  None.     HISTORY OF PRESENT ILLNESS: The patient is an 26-year-old gentleman with progressive left hip and groin pain due to severe osteoarthritis. Symptoms have progressed despite comprehensive conservative treatment and presents for left total hip replacement. Risks, benefits, and alternatives of procedure reviewed with him in detail and he desires to proceed.     HOSPITAL COURSE:  The patient underwent the aforementioned procedure on date of admission under spinal anesthesia with adductor canal block. There were no immediate postoperative complications. He was started on a multimodal pain regimen and DVT prophylaxis.     DISPOSITION: The patient made slow, steady progress with physical therapy and was appropriate for discharge to Home in stable condition on postoperative day 1. DISCHARGE MEDICATIONS:  Reinitiate preadmission medications. In addition, the patient will be on ASA for DVT prophylaxis and low dose oxycodone and Tylenol for pain. DISCHARGE INSTRUCTIONS:  Detailed printed instructions were provided to the patient. Follow up with Dr. Stella Stapleton in approximately 3 weeks. The patient will receive home health physical therapy in the meantime.     Signed by: Ana Tariq PA-C  2021

## 2021-04-01 NOTE — PERIOP NOTES
TRANSFER - OUT REPORT:    Verbal report given to Carlton Stapleton (name) on Beverley Cox  being transferred to 27-21-80-10 (unit) for routine post - op       Report consisted of patients Situation, Background, Assessment and   Recommendations(SBAR). Time Pre op antibiotic given:10:51  Anesthesia Stop time: 12:56  Etienne Present on Transfer to floor:N/A  Order for Etienne on Chart:N/A  Discharge Prescriptions with Chart:N/A    Information from the following report(s) SBAR, OR Summary, Procedure Summary, Intake/Output and MAR was reviewed with the receiving nurse. Opportunity for questions and clarification was provided. Is the patient on 02? NO       L/Min RA       Other N/A    Is the patient on a monitor? NO    Is the nurse transporting with the patient? NO    Surgical Waiting Area notified of patient's transfer from PACU? NO, Wife came to visit in PACU and went home      The following personal items collected during your admission accompanied patient upon transfer:   Dental Appliance:    Vision:    Hearing Aid:    Jewelry: Jewelry: None  Clothing: Clothing: With patient  Other Valuables:  Other Valuables: None  Valuables sent to safe:

## 2021-04-01 NOTE — ANESTHESIA PREPROCEDURE EVALUATION
Relevant Problems   CARDIOVASCULAR   (+) Essential hypertension, benign   (+) Stented coronary artery       Anesthetic History   No history of anesthetic complications            Review of Systems / Medical History  Patient summary reviewed, nursing notes reviewed and pertinent labs reviewed    Pulmonary  Within defined limits                 Neuro/Psych   Within defined limits           Cardiovascular    Hypertension          CAD and cardiac stents         GI/Hepatic/Renal  Within defined limits              Endo/Other        Cancer     Other Findings              Physical Exam    Airway  Mallampati: II  TM Distance: 4 - 6 cm  Neck ROM: normal range of motion   Mouth opening: Normal     Cardiovascular  Regular rate and rhythm,  S1 and S2 normal,  no murmur, click, rub, or gallop             Dental  No notable dental hx       Pulmonary  Breath sounds clear to auscultation               Abdominal  GI exam deferred       Other Findings            Anesthetic Plan    ASA: 3  Anesthesia type: spinal          Induction: Intravenous  Anesthetic plan and risks discussed with: Patient

## 2021-04-01 NOTE — PROGRESS NOTES
TRANSFER - IN REPORT:    Verbal report received from Christofer oro RN (name) on Liz Landeros  being received from PACU (unit) for routine post - op      Report consisted of patients Situation, Background, Assessment and   Recommendations(SBAR). Information from the following report(s) SBAR, OR Summary, Procedure Summary, MAR and Recent Results was reviewed with the receiving nurse. Opportunity for questions and clarification was provided. Assessment completed upon patients arrival to unit and care assumed.

## 2021-04-01 NOTE — PROGRESS NOTES
Family Update provided to the pt's wife:  Poonam Villalobos who is waiting in the Surgical Waiting Area.

## 2021-04-02 VITALS
HEART RATE: 69 BPM | OXYGEN SATURATION: 95 % | TEMPERATURE: 98 F | RESPIRATION RATE: 21 BRPM | SYSTOLIC BLOOD PRESSURE: 146 MMHG | WEIGHT: 189.38 LBS | HEIGHT: 73 IN | DIASTOLIC BLOOD PRESSURE: 75 MMHG | BODY MASS INDEX: 25.1 KG/M2

## 2021-04-02 LAB
ANION GAP SERPL CALC-SCNC: 8 MMOL/L (ref 5–15)
BUN SERPL-MCNC: 24 MG/DL (ref 6–20)
BUN/CREAT SERPL: 17 (ref 12–20)
CALCIUM SERPL-MCNC: 8.2 MG/DL (ref 8.5–10.1)
CHLORIDE SERPL-SCNC: 105 MMOL/L (ref 97–108)
CO2 SERPL-SCNC: 24 MMOL/L (ref 21–32)
CREAT SERPL-MCNC: 1.45 MG/DL (ref 0.7–1.3)
GLUCOSE SERPL-MCNC: 115 MG/DL (ref 65–100)
HGB BLD-MCNC: 12.4 G/DL (ref 12.1–17)
POTASSIUM SERPL-SCNC: 3.8 MMOL/L (ref 3.5–5.1)
SODIUM SERPL-SCNC: 137 MMOL/L (ref 136–145)

## 2021-04-02 PROCEDURE — 74011250636 HC RX REV CODE- 250/636: Performed by: PHYSICIAN ASSISTANT

## 2021-04-02 PROCEDURE — 97116 GAIT TRAINING THERAPY: CPT

## 2021-04-02 PROCEDURE — 36415 COLL VENOUS BLD VENIPUNCTURE: CPT

## 2021-04-02 PROCEDURE — 97530 THERAPEUTIC ACTIVITIES: CPT

## 2021-04-02 PROCEDURE — 97165 OT EVAL LOW COMPLEX 30 MIN: CPT

## 2021-04-02 PROCEDURE — 85018 HEMOGLOBIN: CPT

## 2021-04-02 PROCEDURE — 80048 BASIC METABOLIC PNL TOTAL CA: CPT

## 2021-04-02 PROCEDURE — 74011250637 HC RX REV CODE- 250/637: Performed by: PHYSICIAN ASSISTANT

## 2021-04-02 PROCEDURE — 97161 PT EVAL LOW COMPLEX 20 MIN: CPT

## 2021-04-02 PROCEDURE — 74011000250 HC RX REV CODE- 250: Performed by: PHYSICIAN ASSISTANT

## 2021-04-02 PROCEDURE — 97535 SELF CARE MNGMENT TRAINING: CPT

## 2021-04-02 RX ADMIN — ACETAMINOPHEN 500 MG: 500 TABLET ORAL at 06:18

## 2021-04-02 RX ADMIN — KETOROLAC TROMETHAMINE 15 MG: 30 INJECTION, SOLUTION INTRAMUSCULAR at 11:45

## 2021-04-02 RX ADMIN — ACETAMINOPHEN 500 MG: 500 TABLET ORAL at 09:54

## 2021-04-02 RX ADMIN — SODIUM CHLORIDE 125 ML/HR: 9 INJECTION, SOLUTION INTRAVENOUS at 06:18

## 2021-04-02 RX ADMIN — POLYETHYLENE GLYCOL 3350 17 G: 17 POWDER, FOR SOLUTION ORAL at 09:54

## 2021-04-02 RX ADMIN — AMLODIPINE BESYLATE 5 MG: 5 TABLET ORAL at 09:58

## 2021-04-02 RX ADMIN — ASPIRIN 81 MG: 81 TABLET, COATED ORAL at 08:35

## 2021-04-02 RX ADMIN — KETOROLAC TROMETHAMINE 15 MG: 30 INJECTION, SOLUTION INTRAMUSCULAR at 06:18

## 2021-04-02 RX ADMIN — KETOROLAC TROMETHAMINE 15 MG: 30 INJECTION, SOLUTION INTRAMUSCULAR at 01:09

## 2021-04-02 RX ADMIN — LISINOPRIL 40 MG: 10 TABLET ORAL at 09:59

## 2021-04-02 RX ADMIN — ROSUVASTATIN CALCIUM 20 MG: 10 TABLET, COATED ORAL at 09:53

## 2021-04-02 RX ADMIN — DOCUSATE SODIUM 50 MG AND SENNOSIDES 8.6 MG 1 TABLET: 8.6; 5 TABLET, FILM COATED ORAL at 09:54

## 2021-04-02 RX ADMIN — METOPROLOL SUCCINATE 50 MG: 50 TABLET, EXTENDED RELEASE ORAL at 09:59

## 2021-04-02 RX ADMIN — CEFAZOLIN SODIUM 2 G: 1 INJECTION, POWDER, FOR SOLUTION INTRAMUSCULAR; INTRAVENOUS at 06:18

## 2021-04-02 NOTE — OP NOTES
[address]   OPERATIVE REPORT    Name:  Barry Esparza  MR#:  [id number]  :  1940  ACCOUNT #:  [de-identified]  DATE OF SERVICE:  2021      PREOPERATIVE DIAGNOSIS:  Osteoarthritis of left hip. POSTOPERATIVE DIAGNOSIS:  Osteoarthritis of left hip. PROCEDURE PERFORMED:  Left total hip arthroplasty. SURGEON:  Robert Wagner MD    FIRST ASSISTANT:  Lyndsey Chi PA-C    ANESTHESIA:  Spinal with sedation. COMPLICATIONS:  None. SPECIMENS REMOVED:  None. IMPLANTS:  Components implanted, DonJoy 60-mm Empowr acetabular shell with 40-mm inside diameter neutral polyethylene liner, size 14 high offset TaperFill femoral stem with 40-mm +4 ceramic femoral head. ESTIMATED BLOOD LOSS:  300 mL. INDICATIONS:  The patient is an 49-year-old gentleman with progressive left hip and groin pain due to severe osteoarthritis. Symptoms have progressed despite comprehensive conservative treatment and presents for left total hip replacement. Risks, benefits, and alternatives of procedure reviewed with him in detail and he desires to proceed. PROCEDURE IN DETAIL:  The patient was taken to the operating room where anesthesia team placed a spinal.  Preoperative IV antibiotics were administered. He was turned to right lateral decubitus position on a [facility] frame hip positioner. All bony prominences were well-padded and an axillary roll was placed. Left hip and thigh were prepped and draped in usual sterile fashion. Through a lateral hip incision, I performed a posterior approach to the left hip. Short rotators and posterior capsule were reflected posteriorly. Leg lengths were checked on the table for comparison with trial reduction later during the procedure. Hip was dislocated and femoral neck osteotomy was made. Acetabular retractors were placed and remaining labrum was excised.   Acetabulum was reamed with hemispherical reamers up to 59 mm before impacting a 60-mm Empowr acetabular shell. Intrinsic stability was satisfactory that was augmented with one cancellous screw. 40-mm inside diameter neutral trial liner was placed. Femur was prepared with TaperFill broaches up to size 14 before achieving rotational stability. Calcar was planed. Based on native anatomy, hip was reduced with a high offset neck trial.  40-mm, +4 head trial produced satisfactory leg length and soft tissue tension. Hip was stable to full extension and external rotation with no internal impingement, stable to straight hyperflexion, and with the hip flexed 90 degrees and neutral abduction, there was greater than 70 to 80 degrees of internal rotation. Trials were removed. The wound was copiously irrigated by pulse lavage before the real components were implanted. Same leg length and stability were achieved. Periarticular soft tissues were injected with solution containing 0.5% ropivacaine with epinephrine as well as clonidine and Toradol. Due to significant preoperative contracture, the posterior capsule was not able to be repaired formally. I was able to pull the capsule and proximal rotators over the posterior aspect of the femoral head and repair to the posterior aspect of gluteus minimus. Deep fascia was closed with a combination of heavy Vicryl sutures and a running #2 Stratafix suture. Skin and subcutaneous layers were closed in layered fashion with Vicryl and a running Monocryl subcuticular stitch. Wound was dressed with Dermabond and Aquacel occlusive dressing. The patient's bladder was decompressed with straight catheterization before he was transported to the [place] [address] Unit in stable condition. All counts were correct at the end of the procedure. Note that before wound closure, 2 g of topical tranexamic acid were applied to the wound. The physician assistant was critical throughout the case to assist with positioning, retraction, and closure.   There were no other available residents, fellows, or surgical assistants available to assist during this procedure.     Danielle Durant MD      [id number]  D:  04/01/2021 21:22  T:  04/02/2021 2:06  JOB #:  [id number]  CC:  MD Joey Duarte MD

## 2021-04-02 NOTE — PROGRESS NOTES
OCCUPATIONAL THERAPY EVALUATION/DISCHARGE  Patient: Donovan Palm (55 y.o. male)  Date: 4/2/2021  Primary Diagnosis: Primary localized osteoarthritis of left hip [M16.12]  Procedure(s) (LRB):  LEFT TOTAL HIP ARTHROPLASTY (BLOCK) (Left) 1 Day Post-Op   Precautions:  Fall, WBAT    ASSESSMENT  Based on the objective data described below, the patient presents with decreased distal LLE reaching; however, with provided AE training, pt completes distal LE bathing/dressing with Supervision. Pt demonstrated good SBA level mobility/balance with cues for pacing and RW usage and good understanding verbalized. Pt states he resides with his wife who is able to provide PRN ADL/IADL assist, with pt reporting DME needs fulfilled and he will independently acquire recommended AE hip kit. Given pt's high level of safe functional independence, DME/AE needs fulfilled and good in-home ADL/IADL assist available, no other acute OT needs identified, with OT answering pt's questions/concerns and pt thanking therapist for his efforts. Current Level of Function (ADLs/self-care): SBA/Supervision    Functional Outcome Measure: The patient scored 70/100 on the Barthel Index outcome measure. Other factors to consider for discharge: none     PLAN :  Recommendation for discharge: (in order for the patient to meet his/her long term goals)  No skilled occupational therapy/ follow up rehabilitation needs identified at this time. This discharge recommendation:  Has been made in collaboration with the attending provider and/or case management    IF patient discharges home will need the following DME: patient owns DME required for discharge       SUBJECTIVE:   Patient stated Thanks for your help, now I just waiting for my wife to come pick me up.     OBJECTIVE DATA SUMMARY:   HISTORY:   Past Medical History:   Diagnosis Date    Allergic rhinitis, cause unspecified 3/31/2010    ASCVD (arteriosclerotic cardiovascular disease) 3/31/2010    CAD (coronary artery disease)     Cancer (HCC)     BASAL CELL ON RIGHT EAR    Diverticulosis of colon (without mention of hemorrhage) 3/31/2010    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    Essential hypertension, benign 3/31/2010    Hypercholesterolemia     Other and unspecified hyperlipidemia 3/31/2010    Personal history of colonic polyps 3/31/2010    Personal history of MI (myocardial infarction) 3/31/2010    Stented coronary artery 3/31/2010     Past Surgical History:   Procedure Laterality Date    ENDOSCOPY, COLON, DIAGNOSTIC  2007,2011    HX APPENDECTOMY  2016    HX CATARACT REMOVAL Bilateral     HX CORONARY STENT PLACEMENT  2005    HX HEENT      TEETH EXTRACTIONS    HX TONSILLECTOMY      AS A CHILD       Prior Level of Function/Environment/Context: Independent with ADLs/IADLs   Expanded or extensive additional review of patient history:     Home Situation  Home Environment: Private residence  # Steps to Enter: 8  Rails to Enter: Yes  Hand Rails : Right  One/Two Story Residence: One story  Living Alone: No  Support Systems: Spouse/Significant Other/Partner  Patient Expects to be Discharged to[de-identified] Private residence  Current DME Used/Available at Home: Grab bars, Walker, rolling, Raised toilet seat  Tub or Shower Type: Shower    Hand dominance: Right    EXAMINATION OF PERFORMANCE DEFICITS:  Cognitive/Behavioral Status:  Neurologic State: Alert  Orientation Level: Oriented X4  Cognition: Appropriate decision making; Appropriate for age attention/concentration; Appropriate safety awareness  Perception: Appears intact  Perseveration: No perseveration noted  Safety/Judgement: Awareness of environment; Insight into deficits;Home safety      Hearing: Auditory  Auditory Impairment: Hard of hearing, bilateral    Vision/Perceptual:    Corrective Lenses: Reading glasses    Range of Motion:  AROM: Within functional limits  PROM: Within functional limits    Strength:  Strength:  Within functional limits    Coordination:  Coordination: Within functional limits  Fine Motor Skills-Upper: Left Intact; Right Intact    Gross Motor Skills-Upper: Left Intact; Right Intact    Tone & Sensation:  Tone: Normal  Sensation: Intact    Balance:  Sitting: Intact  Standing: Intact; With support(of RW)    Functional Mobility and Transfers for ADLs:    Transfers:  Sit to Stand: Stand-by assistance  Stand to Sit: Stand-by assistance  Bed to Chair: Stand-by assistance  Bathroom Mobility: Stand-by assistance    ADL Assessment:  The patient recalled and demonstrated hip contraindications Left LE during ADLs and functional mobility with Min verbal cues. Feeding: Independent    Oral Facial Hygiene/Grooming: Stand-by assistance    Bathing: Supervision    Upper Body Dressing: Independent    Lower Body Dressing: Supervision    Toileting: Stand by assistance    ADL Intervention and task modifications:  Pt educated on safe transfer techniques, with specific emphasis on proper hand placement to push up from seated surface rather than attempt to pull self up, fully positioning self in-front of desired seated location, feeling chair on back of legs and reaching back with 1-2 UE to slowly lower self to seated position. Patient instructed and indicated understanding the benefits of maintaining activity tolerance, functional mobility, and independence with self care tasks during acute stay  to ensure safe return home and to baseline. Encouraged patient to increase frequency and duration OOB, be out of bed for all meals, perform daily ADLs (as approved by RN/MD regarding bathing etc), and performing functional mobility to/from bathroom. Cognitive Retraining  Safety/Judgement: Awareness of environment; Insight into deficits;Home safety    Bathing: Patient instructed when bathing to not submerge wound in water, stand to shower or sponge bathe, cover wound with plastic and tape to ensure no water reaches bandage/wound without cues.   Patient indicated understanding. Dressing joint: Patient instructed and demonstrated to don/doff Left LE first/last single verbal cues. Patient instructed and demonstrated to don all clothing while sitting prior to standing, doff all clothing to knees while standing, then sit to doff clothing off from knees to feet in order to facilitate fall prevention, pain management, and energy conservation with Supervision. Home safety: Patient instructed on home modifications and safety (raise height of ADL objects, appropriate height of chair surfaces, recliner safety, change of floor surfaces, clear pathways) to increase independence and fall prevention. Patient indicated understanding. Standing: Patient instructed and demonstrated to walk up to sink/counter top/surfaces, step into walker to increase safety of joint and fall prevention with Stand-by assistance. Instructed to apply concept of hip contraindications to ADLs within the home (no extreme movements, square off while using objects, slide objects along surfaces). Patient instructed to increase amount of time standing, observe standing position during ADLs in order to increase even weight bearing through bilateral LEs in order to increase independence with ADLs. Goal to be reached 30 days post - op, per orthopedic surgeon or per PT. Patient indicated understanding. Shower transfer: Patient instructed regarding when it is safe to begin transfer into shower (complete stairs with PT, advance exercises with PT high enough to clear tub height). Patient instructed to use the same technique as used with stairs when entering and exiting tub (\"up with the non-surgical, down with the surgical leg\"). Patient indicated understanding.     Functional Measure:  Barthel Index:    Bathin  Bladder: 10  Bowels: 10  Groomin  Dressin  Feeding: 10  Mobility: 10  Stairs: 5  Toilet Use: 5  Transfer (Bed to Chair and Back): 10  Total: 70/100        The Barthel ADL Index: Guidelines  1. The index should be used as a record of what a patient does, not as a record of what a patient could do. 2. The main aim is to establish degree of independence from any help, physical or verbal, however minor and for whatever reason. 3. The need for supervision renders the patient not independent. 4. A patient's performance should be established using the best available evidence. Asking the patient, friends/relatives and nurses are the usual sources, but direct observation and common sense are also important. However direct testing is not needed. 5. Usually the patient's performance over the preceding 24-48 hours is important, but occasionally longer periods will be relevant. 6. Middle categories imply that the patient supplies over 50 per cent of the effort. 7. Use of aids to be independent is allowed. Maciel Estes., Barthel, D.W. (2067). Functional evaluation: the Barthel Index. [facility] (14)2. BRIANNA Dunaway, Magaly Kraft., Tara Melton., [place], [address] [place] [address] (1999). Measuring the change indisability after inpatient rehabilitation; comparison of the responsiveness of the Barthel Index and Functional [place] Measure. Journal of Neurology, Neurosurgery, and Psychiatry, 66(4), 097-903. Truman Ratliff, N.[place].A, SHANNA Alba, & Felipe Laird M.A. (2004.) Assessment of post-stroke quality of life in cost-effectiveness studies: The usefulness of the Barthel Index and the EuroQoL-5D.  Quality of Life Research, 15, 321-09       Occupational Therapy Evaluation Charge Determination   History Examination Decision-Making   LOW Complexity : Brief history review  LOW Complexity : 1-3 performance deficits relating to physical, cognitive , or psychosocial skils that result in activity limitations and / or participation restrictions  LOW Complexity : No comorbidities that affect functional and no verbal or physical assistance needed to complete eval tasks       Based on the above components, the patient evaluation is determined to be of the following complexity level: LOW   Pain Rating:  No c/o pain    Activity Tolerance:   Good    After treatment patient left in no apparent distress:    Sitting in chair and with CM present and callbell within reach    COMMUNICATION/EDUCATION:   The patients plan of care was discussed with: Physical therapist, Registered nurse and Case management.      Thank you for this referral.  Krys Decker OT  Time Calculation: 18 mins

## 2021-04-02 NOTE — PROGRESS NOTES
Orders received, chart reviewed and patient evaluated by physical therapy. Pending progression with skilled acute physical therapy, recommend:  Physical therapy at least 2 days/week in the home  . Pt is cleared for discharge from PT standpoint. Full evaluation to follow.    Loraine Lay, PT

## 2021-04-02 NOTE — PROGRESS NOTES
Bedside and Verbal shift change report given to Nathan Bonilla RN (oncoming nurse) by Fredrick Ward RN (offgoing nurse). Report included the following information SBAR, OR Summary, Procedure Summary, MAR and Recent Results.

## 2021-04-02 NOTE — PROGRESS NOTES
Orthopaedics Daily Progress Note                            Date of Surgery:  4/1/2021      Patient: Kurt Nam   YOB: 1940  Age: [de-identified] y.o. SUBJECTIVE:   1 Day Post-Op following LEFT TOTAL HIP ARTHROPLASTY (BLOCK). The patient's post operative pain is controlled. No CP/SOB. No N/V. The patient's mobility will be evaluated today during PT sessions. OBJECTIVE:     Vital Signs:      Visit Vitals  BP (!) 140/77 (BP 1 Location: Left upper arm, BP Patient Position: Standing)   Pulse 68   Temp 97.7 °F (36.5 °C)   Resp 20   Ht 6' 1\" (1.854 m)   Wt 85.9 kg (189 lb 6 oz)   SpO2 96%   BMI 24.99 kg/m²       Physical Exam:  General: A&Ox3. The patient is cooperative, and in no acute distress. Respiratory: Respirations are unlabored. Surgical site(s): dressing clean, dry  Musculoskeletal: Calves are soft, supple, and non-tender upon palpation. Motor 5/5. Neurological:  Neurovascularly intact with good dorsi and plantar flexion. Pulses symmetrical.    Laboratory Values:             Recent Results (from the past 12 hour(s))   HEMOGLOBIN    Collection Time: 04/02/21  6:26 AM   Result Value Ref Range    HGB 12.4 12.1 - 17.0 g/dL         PLAN:     S/P LEFT TOTAL HIP ARTHROPLASTY (BLOCK) -Continue WBAT. -Mobilize and continue with PT/OT until discharged     Hemodynamics Hgb today is 12.4. Patient asymptomatic. Continue to monitor. Wound Monitor postop dressing; no postop dressing changes necessary. Reinforce PRN. Post Operative Pain Pain Control: stable, mild-to-moderate joint symptoms intermittently, reasonably well controlled by current meds. DVT Prophylaxis Continue with SCD'S, Ankle Pump Exercises. ASA 81mg BID     Discharge Disposition Discharge plan: Home with HHPT pending PT clearance.        Signed By: Zeeshan Vigil PA-C  April 2, 2021 7:42 AM

## 2021-04-02 NOTE — PROGRESS NOTES
PHYSICAL THERAPY EVALUATION/DISCHARGE  Patient: Teresa Aburto (42 y.o. male)  Date: 4/2/2021  Primary Diagnosis: Primary localized osteoarthritis of left hip [M16.12]  Procedure(s) (LRB):  LEFT TOTAL HIP ARTHROPLASTY (BLOCK) (Left) 1 Day Post-Op   Precautions:   Fall, WBAT      ASSESSMENT  Based on the objective data described below, the patient presents POD#1 left SAL with minimal pain, decreased AROM/strength and function left leg, decreased activity tolerance, decline in functional mobility and impaired standing balance/gait without use of assistive device. Pt doing well - pt already dressed and has been up to bathroom with RW with nursing. Pt demonstrated supine THR exercises and reviewed use of ice and elevation. Pt demonstrated safe and indep amb with RW - 300 feet and indep on stairs with rail/cane. Patient is cleared for discharge from PT standpoint. Functional Outcome Measure: The patient scored 90/100 on the Barthel Index outcome measure which is indicative of mild disability for ADL's and functional mobility. Other factors to consider for discharge: wife available to assist at home as needed     Further skilled acute physical therapy is not indicated at this time. PLAN :  Recommendation for discharge: (in order for the patient to meet his/her long term goals)  Physical therapy at least 2 days/week in the home     This discharge recommendation:  Has been made in collaboration with the attending provider and/or case management    IF patient discharges home will need the following DME: patient owns DME required for discharge       SUBJECTIVE:   Patient stated I'm feeling great - I'm ready to go home.     OBJECTIVE DATA SUMMARY:   HISTORY:    Past Medical History:   Diagnosis Date    Allergic rhinitis, cause unspecified 3/31/2010    ASCVD (arteriosclerotic cardiovascular disease) 3/31/2010    CAD (coronary artery disease)     Cancer (HCC)     BASAL CELL ON RIGHT EAR    Diverticulosis of colon (without mention of hemorrhage) 3/31/2010    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    Essential hypertension, benign 3/31/2010    Hypercholesterolemia     Other and unspecified hyperlipidemia 3/31/2010    Personal history of colonic polyps 3/31/2010    Personal history of MI (myocardial infarction) 3/31/2010    Stented coronary artery 3/31/2010     Past Surgical History:   Procedure Laterality Date    ENDOSCOPY, COLON, DIAGNOSTIC  2007,2011    HX APPENDECTOMY  2016    HX CATARACT REMOVAL Bilateral     HX CORONARY STENT PLACEMENT  2005    HX HEENT      TEETH EXTRACTIONS    HX TONSILLECTOMY      AS A CHILD       Prior level of function: Independent without assistive device  Personal factors and/or comorbidities impacting plan of care: as above    Home Situation  Home Environment: Private residence  # Steps to Enter: 8  Rails to Enter: Yes  Hand Rails : Right  One/Two Story Residence: One story  Living Alone: No  Support Systems: Spouse/Significant Other/Partner  Patient Expects to be Discharged to[de-identified] Private residence  Current DME Used/Available at Home: Grab bars, Walker, rolling, Raised toilet seat  Tub or Shower Type: Shower    EXAMINATION/PRESENTATION/DECISION MAKING:   Critical Behavior:  Neurologic State: Alert  Orientation Level: Oriented X4  Cognition: Appropriate decision making, Appropriate for age attention/concentration, Appropriate safety awareness  Safety/Judgement: Awareness of environment, Insight into deficits, Home safety  Hearing: Auditory  Auditory Impairment: Hard of hearing, bilateral  Range Of Motion:  AROM: Within functional limits           PROM: Within functional limits           Strength:    Strength:  Within functional limits                    Tone & Sensation:   Tone: Normal              Sensation: Intact               Coordination:  Coordination: Within functional limits  Vision:   Corrective Lenses: Reading glasses  Functional Mobility:  Bed Mobility:   Supine to sit:  Independent           Transfers:  Sit to Stand: Stand-by assistance  Stand to Sit: Stand-by assistance        Bed to Chair: Stand-by assistance              Balance:   Sitting: Intact; Without support  Standing: Intact; With support  Ambulation/Gait Training:  Distance (ft): 300 Feet (ft)     Ambulation - Level of Assistance: Stand-by assistance        Gait Abnormalities: Decreased step clearance  Right Side Weight Bearing: Full  Left Side Weight Bearing: As tolerated  Base of Support: Shift to right  Stance: Left decreased  Speed/Nicolasa: Slow  Step Length: Right shortened;Left shortened  Swing Pattern: Left asymmetrical       Stairs:  Number of Stairs Trained: 4  Stairs - Level of Assistance: Supervision   Rail Use: Right (cane left hand ascending)    Therapeutic Exercises:   Pt demonstrated supine THR exercises - ankle pumps, hamstring sets, quad and gluteal sets, heel slides hip/knee flex, hip abd/add    Functional Measure:  Barthel Index:    Bathin  Bladder: 10  Bowels: 10  Groomin  Dressin  Feeding: 10  Mobility: 15  Stairs: 10  Toilet Use: 10  Transfer (Bed to Chair and Back): 15  Total: 90/100       The Barthel ADL Index: Guidelines  1. The index should be used as a record of what a patient does, not as a record of what a patient could do. 2. The main aim is to establish degree of independence from any help, physical or verbal, however minor and for whatever reason. 3. The need for supervision renders the patient not independent. 4. A patient's performance should be established using the best available evidence. Asking the patient, friends/relatives and nurses are the usual sources, but direct observation and common sense are also important. However direct testing is not needed. 5. Usually the patient's performance over the preceding 24-48 hours is important, but occasionally longer periods will be relevant.   6. Middle categories imply that the patient supplies over 50 per cent of the effort. 7. Use of aids to be independent is allowed. Dorsie Eth., Barthel, D.W. (0293). Functional evaluation: the Barthel Index. [address] (14)2. BRIANNA Wheatley, Negro Escobar., Malinda Singh., [place], [address] (1999). Measuring the change indisability after inpatient rehabilitation; comparison of the responsiveness of the Barthel Index and Functional [place] Measure. Journal of Neurology, Neurosurgery, and Psychiatry, 66(4), 094-261. MONAE Negron, SHANNA Alba, & Colin Daniels M.A. (2004.) Assessment of post-stroke quality of life in cost-effectiveness studies: The usefulness of the Barthel Index and the EuroQoL-5D. Quality of Life Research, 15, 281-63          Physical Therapy Evaluation Charge Determination   History Examination Presentation Decision-Making   LOW Complexity : Zero comorbidities / personal factors that will impact the outcome / POC LOW Complexity : 1-2 Standardized tests and measures addressing body structure, function, activity limitation and / or participation in recreation  LOW Complexity : Stable, uncomplicated  LOW Complexity : FOTO score of       Based on the above components, the patient evaluation is determined to be of the following complexity level: LOW     Pain Rating:  Left hip 3/10    Activity Tolerance:   Good - up OOB/amb to bathroom - cleared for d/c      After treatment patient left in no apparent distress:   Sitting in chair and Call bell within reach    COMMUNICATION/EDUCATION:   The patients plan of care was discussed with: Occupational therapist, Registered nurse and Case management. Fall prevention education was provided and the patient/caregiver indicated understanding.     Thank you for this referral.  Husam Song, PT   Time Calculation: 40 mins

## 2021-04-02 NOTE — PROGRESS NOTES
I have reviewed discharge instructions with the patient. The patient verbalized understanding. Signs, symptoms, and side effects reviewed. Opportunity for questions and clarification allowed. Patient discharging home via private vehicle with home health orders in place. Signed, hard-copy of discharge instructions placed on patient's chart.

## 2021-04-02 NOTE — PROGRESS NOTES
Bedside and Verbal shift change report given to [address] [place] [address] (oncoming nurse) by Rosangela Naidu (offgoing nurse). Report included the following information SBAR, Kardex and MAR.  Pt handle orthostatic mobility very well

## 2021-04-02 NOTE — PROGRESS NOTES
WENDIE: Patient plans to discharge home today with [facility] and family to transport when stable for discharge. RUR: N/A    1. Patient is from home. 2. Orthopedics and PT/OT following. 3. Patient plans to discharge home with [place] and family to discharge. Observation notice provided in writing to patient and/or caregiver as well as verbal explanation of the policy. Patients who are in outpatient status also receive the Observation notice. Care Management Interventions  PCP Verified by CM: Yes  Palliative Care Criteria Met (RRAT>21 & CHF Dx)?: No  Mode of Transport at Discharge: Other (see comment)  Transition of Care Consult (CM Consult): [address]: No  Reason Outside [place]: Physician referred to specific agency  MyChart Signup: No  Discharge Durable Medical Equipment: No  Health Maintenance Reviewed: Yes  Physical Therapy Consult: Yes  Occupational Therapy Consult: Yes  Speech Therapy Consult: No  Current Support Network: Lives with Spouse  Confirm Follow Up Transport: Family  The Plan for Transition of Care is Related to the Following Treatment Goals : Patient plans to discharge home with [place] and family to transport. The Patient and/or Patient Representative was Provided with a Choice of Provider and Agrees with the Discharge Plan?: Yes  Freedom of Choice List was Provided with Basic Dialogue that Supports the Patient's Individualized Plan of Care/Goals, Treatment Preferences and Shares the Quality Data Associated with the Providers?: Yes   Resource Information Provided?: No  Discharge Location  Discharge Placement: Home with home health     Reason for Admission:  Left Total Hip surgery                     RUR Score:     N/A                Plan for utilizing home health:       The Plan for Transition of Care is related to the following treatment goals: [facility]    The Patient and/or patient representative Nahed Kim was provided with a choice of provider and agrees   with the discharge plan. [x] Yes [] No    Freedom of choice list was provided with basic dialogue that supports the patient's individualized plan of care/goals, treatment preferences and shares the quality data associated with the providers. [x] Yes [] No       PCP: First and Last name:  Diya Dumont MD,   [phone]   Name of Practice:    Are you a current patient: Yes/No: Yes   Approximate date of last visit: March, 2021   Can you participate in a virtual visit with your PCP: Yes                    Current Advanced Directive/Advance Care Plan: Full Code      Healthcare Decision Maker:   Click here to complete [address] including selection of the Healthcare Decision Maker Relationship (ie \"Primary\")                             Transition of Care Plan:            CM spoke with patient in room 568. Patient is alert and oriented x4. Demographics confirmed. Patient is independent with ADL's/IADL's, drives a vehicle, owns his own walker and uses [facility] pharmacy in [facility]. Patient plans to discharge home with [facility] with wife to transport when stable for discharge. CM following for discharge needs.     Theone Hodgkin RN/CRM

## 2021-04-05 ENCOUNTER — PATIENT OUTREACH (OUTPATIENT)
Dept: CASE MANAGEMENT | Age: 81
End: 2021-04-05

## 2021-04-05 NOTE — NURSE NAVIGATOR
[facility]  [facility] Orthopaedic Pathway Handoff     FROM:                                TO: At [address]                                                      ([facility] or Facility name)  [place] [address]  Dept: [address]: [phone]                                      Room#:  568/01                                                       Nurse Navigator:  Freddy Leigh RN         SITUATION      ASAScore: ASA 3 - Patient with moderate systemic disease with functional limitations    Admitted:  4/1/2021  Hospital Day: 2      Attending Provider:  No att. providers found     Consultations:  None    PCP:  Nando Marie MD   [phone]     Admitting Dx:  Primary localized osteoarthritis of left hip [M16.12]       Active Problems:    Primary localized osteoarthritis of left hip (4/1/2021)      4 Days Post-Op of   Procedure(s):  LEFT TOTAL HIP ARTHROPLASTY (BLOCK)   BY: Herve Mathis MD             ON: 4/1/2021                  Code Status: Prior             Advance Directive? Not Received (Send w/patient)     Isolation:  There are currently no Active Isolations       MDRO: No current active infections    BACKGROUND     Allergies:  No Known Allergies    Past Medical History:   Diagnosis Date    Allergic rhinitis, cause unspecified 3/31/2010    ASCVD (arteriosclerotic cardiovascular disease) 3/31/2010    CAD (coronary artery disease)     Cancer (HCC)     BASAL CELL ON RIGHT EAR    Diverticulosis of colon (without mention of hemorrhage) 3/31/2010    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    Essential hypertension, benign 3/31/2010    Hypercholesterolemia     Other and unspecified hyperlipidemia 3/31/2010    Personal history of colonic polyps 3/31/2010    Personal history of MI (myocardial infarction) 3/31/2010    Stented coronary artery 3/31/2010       Past Surgical History:   Procedure Laterality Date    ENDOSCOPY, COLON, DIAGNOSTIC  2007,2011    HX APPENDECTOMY  2016    HX CATARACT REMOVAL Bilateral     HX CORONARY STENT PLACEMENT  2005    HX HEENT      TEETH EXTRACTIONS    HX TONSILLECTOMY      AS A CHILD       Prior to Admission Medications   Prescriptions Last Dose Informant Patient Reported? Taking? amLODIPine (NORVASC) 5 mg tablet 4/1/2021 at 0630  Yes Yes   Sig: Take 5 mg by mouth daily. aspirin (ASPIRIN) 325 mg tablet 3/31/2021 at Unknown time  Yes No   Sig: Take 325 mg by mouth daily. hydrochlorothiazide (MICROZIDE) 12.5 mg capsule 3/31/2021 at Unknown time  No Yes   Sig: Take 1 Cap by mouth daily. lisinopril (PRINIVIL, ZESTRIL) 40 mg tablet 3/31/2021 at Unknown time  No Yes   Sig: Take 1 Tab by mouth daily. metoprolol-XL (TOPROL XL) 50 mg XL tablet 4/1/2021 at 0630  No Yes   Sig: Take 1 Tab by mouth daily. nitroglycerin (NITROSTAT) 0.4 mg SL tablet   Yes No   Sig: by SubLINGual route every five (5) minutes as needed. rosuvastatin (CRESTOR) 20 mg tablet 4/1/2021 at 0630  Yes Yes   Sig: Take 20 mg by mouth daily. tadalafil (CIALIS) 10 mg tablet 3/25/2021 at Unknown time  No Yes   Sig: Take 5 mg by mouth as needed. Facility-Administered Medications: None       Vaccinations:    Immunization History   Administered Date(s) Administered    Influenza Vaccine Split 10/21/2010, 11/14/2012    TD Vaccine 12/19/2007         ASSESSMENT   Age: [de-identified] y.o. Gender: male        Height: Height: 6' 1\" (185.4 cm)                    Weight:Weight: 85.9 kg (189 lb 6 oz)     No data found. Active Orders   There are no active orders of the following types: Diet.        Orientation: Orientation Level: Oriented X4    Active Lines/Drains:  (Peg Tube / Etienne / CL or S/L?):no    Urinary Status: Voiding      Last BM:       Skin Integrity: Incision (comment)(L hip)             Mobility: Slightly limited   Weight Bearing Status: WBAT (Weight Bearing as Tolerated)      Gait Training  Ambulation - Level of Assistance: Stand-by assistance  Distance (ft): 300 Feet (ft)  Stairs - Level of Assistance: Supervision  Number of Stairs Trained: 4  Rail Use: Right (cane left hand ascending)     On Anticoagulation? YES  Aspirin                                         Pain Medications given:  oxycodone                                   Lab Results   Component Value Date/Time    Glucose 115 (H) 04/02/2021 06:26 AM    Hemoglobin A1c 5.4 03/25/2021 12:07 PM    INR 1.0 03/25/2021 12:07 PM    HGB 12.4 04/02/2021 06:26 AM    HGB 14.5 03/25/2021 12:07 PM    HGB 13.9 08/29/2013 08:07 AM    HGB 14.4 02/26/2013 07:42 AM       Readmission Risks:  Score:         RECOMMENDATION     See After Visit Summary (AVS) for:  · Discharge instructions  · After [address]   · Medication Reconciliation          [facility] Orthopaedic Nurse Navigator  Beryle Blinks, BSN, RN-BC       Office  [phone]  Cell      [phone]  Fax      [phone]  [email or web address]             . Phy

## 2022-03-19 PROBLEM — M16.12 PRIMARY LOCALIZED OSTEOARTHRITIS OF LEFT HIP: Status: ACTIVE | Noted: 2021-04-01

## 2022-04-04 ENCOUNTER — OFFICE VISIT (OUTPATIENT)
Dept: ORTHOPEDIC SURGERY | Age: 82
End: 2022-04-04
Payer: MEDICARE

## 2022-04-04 VITALS — BODY MASS INDEX: 23.74 KG/M2 | HEIGHT: 74 IN | WEIGHT: 185 LBS

## 2022-04-04 DIAGNOSIS — M51.36 DDD (DEGENERATIVE DISC DISEASE), LUMBAR: ICD-10-CM

## 2022-04-04 DIAGNOSIS — Z96.642 STATUS POST LEFT HIP REPLACEMENT: Primary | ICD-10-CM

## 2022-04-04 PROCEDURE — 99213 OFFICE O/P EST LOW 20 MIN: CPT | Performed by: ORTHOPAEDIC SURGERY

## 2022-04-04 PROCEDURE — G8536 NO DOC ELDER MAL SCRN: HCPCS | Performed by: ORTHOPAEDIC SURGERY

## 2022-04-04 PROCEDURE — G8432 DEP SCR NOT DOC, RNG: HCPCS | Performed by: ORTHOPAEDIC SURGERY

## 2022-04-04 PROCEDURE — 1101F PT FALLS ASSESS-DOCD LE1/YR: CPT | Performed by: ORTHOPAEDIC SURGERY

## 2022-04-04 PROCEDURE — G8420 CALC BMI NORM PARAMETERS: HCPCS | Performed by: ORTHOPAEDIC SURGERY

## 2022-04-04 PROCEDURE — G8756 NO BP MEASURE DOC: HCPCS | Performed by: ORTHOPAEDIC SURGERY

## 2022-04-04 PROCEDURE — G8427 DOCREV CUR MEDS BY ELIG CLIN: HCPCS | Performed by: ORTHOPAEDIC SURGERY

## 2022-04-04 NOTE — LETTER
4/7/2022    Patient: Joe Clark   YOB: 1940   Date of Visit: 4/4/2022     Danielle Guzman MD  [address] [address]  Via Fax: [phone]    Dear Danielle Guzman MD,      Thank you for referring Mr. Seferino Vernon Jr to [facility] for evaluation. My notes for this consultation are attached. If you have questions, please do not hesitate to call me. I look forward to following your patient along with you.       Sincerely,    Lidia Tate MD

## 2022-04-07 NOTE — PROGRESS NOTES
Eric Cerda (: ) is a 80 y.o. male, patient, here for evaluation of the following chief complaint(s):  Surgical Follow-up ([address] 1 year follow-up)       SUBJECTIVE/OBJECTIVE:  Eric Cerda presents today for routine 1 year follow-up after left primary total hip replacement. Overall the hip is functioning well and does not limit his activities. He denies anterior hip and groin pain. No thigh pain. However, he does relate gluteal pain with activities. Generally does not have pain at rest.  Occasional midline low back pain. No distal radicular symptoms. No focal numbness weakness or tingling distally. PHYSICAL EXAM:  Vitals: Ht 6' 2\" (1.88 m)   Wt 185 lb (83.9 kg)   BMI 23.75 kg/m²   Body mass index is 23.75 kg/m². 80y.o. year old M, no distress. Flexion extension of lumbar spine does not reproduce gluteal pain. No paraspinal spasm or tenderness. No nerve tension signs. No gluteal or trochanteric tenderness over left hip. Left lateral hip scar well-healed. Negative Stinchfield. Pain-free passive left hip range of motion which is well-preserved. Symmetrical palpable distal pulses. No gross motor or sensory deficits in lower extremities. No distal edema. IMAGING:  Radiographs:   XR Results (most recent):  Results from Appointment encounter on 22    XR HIP LT W OR WO PELV 2-3 VWS    Narrative  3 x-ray views left hip including AP pelvis, AP and frog lateral images demonstrate satisfactory position alignment of cementless left total hip components with signs of ingrowth present. No evidence of loosening. Awilda 2 heterotopic ossification present. No evidence of severe lumbar and lumbosacral degenerative disc disease. ASSESSMENT/PLAN:  1.  Status post left hip replacement  -     XR HIP LT W OR WO PELV 2-3 VWS; Future  -     REFERRAL TO PHYSICAL THERAPY  2. DDD (degenerative disc disease), lumbar  -     REFERRAL TO PHYSICAL THERAPY    The xray and exam findings were discussed with the patient today. Well-functioning left total hip, 1 year postop. I think he is mild gluteal symptoms are referred from lumbar degenerative disc disease. Discussed conservative treatment measures. We will start with a simple course of physical therapy followed by long-term home exercise program.    Return in 4 years for routine x-ray follow-up of left total hip, sooner if needed for current lumbar spine symptoms. Return for routine 5 year postop visit. Review Of Systems  ROS     Positive for: Musculoskeletal    Last edited by Chaz Huynh RN on 4/4/2022 11:39 AM. (History)         Patient denies any recent fever, chills, nausea, vomiting, chest pain, or shortness of breath. No Known Allergies    Current Outpatient Medications   Medication Sig    amLODIPine (NORVASC) 5 mg tablet Take 5 mg by mouth daily.  rosuvastatin (CRESTOR) 20 mg tablet Take 20 mg by mouth daily.  metoprolol-XL (TOPROL XL) 50 mg XL tablet Take 1 Tab by mouth daily.  lisinopril (PRINIVIL, ZESTRIL) 40 mg tablet Take 1 Tab by mouth daily.  hydrochlorothiazide (MICROZIDE) 12.5 mg capsule Take 1 Cap by mouth daily.  tadalafil (CIALIS) 10 mg tablet Take 5 mg by mouth as needed.  aspirin delayed-release 81 mg tablet Take 1 Tab by mouth two (2) times a day. Indications: blood clot prevention (Patient not taking: Reported on 4/4/2022)    senna-docusate (PERICOLACE) 8.6-50 mg per tablet Take 1 Tab by mouth two (2) times daily as needed for Constipation. (Patient not taking: Reported on 4/4/2022)    acetaminophen (TYLENOL) 500 mg tablet Take 1 Tab by mouth every four (4) hours.  nitroglycerin (NITROSTAT) 0.4 mg SL tablet by SubLINGual route every five (5) minutes as needed. No current facility-administered medications for this visit.        Past Medical History:   Diagnosis Date    Allergic rhinitis, cause unspecified 3/31/2010    ASCVD (arteriosclerotic cardiovascular disease) 3/31/2010    CAD (coronary artery disease)     Cancer (HCC)     BASAL CELL ON RIGHT EAR    Diverticulosis of colon (without mention of hemorrhage) 3/31/2010    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    Essential hypertension, benign 3/31/2010    Hypercholesterolemia     Other and unspecified hyperlipidemia 3/31/2010    Personal history of colonic polyps 3/31/2010    Personal history of MI (myocardial infarction) 3/31/2010    Stented coronary artery 3/31/2010        Past Surgical History:   Procedure Laterality Date    ENDOSCOPY, COLON, DIAGNOSTIC  ,    HX APPENDECTOMY  2016    HX CATARACT REMOVAL Bilateral     HX CORONARY STENT PLACEMENT      HX HEENT      TEETH EXTRACTIONS    HX TONSILLECTOMY      AS A CHILD       Family History   Problem Relation Age of Onset    Cancer Mother         BRAIN TUMOR    Heart Disease Father     Arthritis-rheumatoid Sister     Cancer Sister         UTERINE    No Known Problems Sister     Anesth Problems Neg Hx         Social History     Socioeconomic History    Marital status:      Spouse name: Not on file    Number of children: Not on file    Years of education: Not on file    Highest education level: Not on file   Occupational History    Not on file   Tobacco Use    Smoking status: Former Smoker     Packs/day: 1.50     Years: 22.00     Pack years: 33.00     Quit date: 1980     Years since quittin.8    Smokeless tobacco: Never Used   Vaping Use    Vaping Use: Never used   Substance and Sexual Activity    Alcohol use:  Yes     Alcohol/week: 14.0 standard drinks     Types: 14 Shots of liquor per week    Drug use: Never    Sexual activity: Not on file   Other Topics Concern    Not on file   Social History Narrative    Not on file     Social Determinants of Health     Financial Resource Strain:     Difficulty of Paying Living Expenses: Not on file   Food Insecurity:     Worried About Running Out of Food in the Last Year: Not on file    Ran Out of Food in the Last Year: Not on file   Transportation Needs:     Lack of Transportation (Medical): Not on file    Lack of Transportation (Non-Medical): Not on file   Physical Activity:     Days of Exercise per Week: Not on file    Minutes of Exercise per Session: Not on file   Stress:     Feeling of Stress : Not on file   Social Connections:     Frequency of Communication with Friends and Family: Not on file    Frequency of Social Gatherings with Friends and Family: Not on file    Attends Restoration Services: Not on file    Active Member of [address] [place] [address] [employer] or Organizations: Not on file    Attends Club or Organization Meetings: Not on file    Marital Status: Not on file   Intimate Partner Violence:     Fear of Current or Ex-Partner: Not on file    Emotionally Abused: Not on file    Physically Abused: Not on file    Sexually Abused: Not on file   Housing Stability:     Unable to Pay for Housing in the Last Year: Not on file    Number of [place] in the Last Year: Not on file    Unstable Housing in the Last Year: Not on file       Orders Placed This Encounter    XR HIP LT [facility]. 2-3 VWS     Standing Status:   Future     Number of Occurrences:   1     Standing Expiration Date:   4/5/2023    REFERRAL TO PHYSICAL THERAPY     Referral Priority:   Routine     Referral Type:   PT/OT/ST     Referral Reason:   Specialty Services Required     Number of Visits Requested:   1        An electronic signature was used to authenticate this note.   -- Raghavendra Sexton MD

## 2023-08-30 NOTE — PERIOP NOTES
LAB RESULTS SENT THROUGH [facility] TO DR SAWYER'S OFFICE Statement Selected

## 2024-05-14 RX ORDER — ASPIRIN 325 MG
325 TABLET ORAL DAILY
COMMUNITY

## 2024-05-14 NOTE — PERIOP NOTE
[facility]  Ambulatory Surgery Unit  Pre-operative Instructions    Surgery/Procedure Date  Wednesday, May 22, 2024            Tentative Arrival Time TBD      1. On the day of your surgery/procedure, please report to the Ambulatory Surgery Unit Registration Desk and sign in at your designated time. The Ambulatory Surgery Unit is located in [facility] on the [facility] of the \[facility] [place] [facility]\"" across from the [facility]. Please have all of your health insurance cards, co-payment, and a photo ID.    **TWO adults may accompany you the day of the procedure.  We have limited seating available.      2. You cannot be dropped off for surgery.  Please make arrangements for a responsible adult friend or family member to remain on the hospital campus during your procedure, and drive you home, as you should not drive for 24 hours following anesthesia. Make arrangements for a responsible adult to stay with you for at least the first 24 hours after your surgery.    3. Do not have anything to eat or drink (including water, gum, mints, coffee, juice) after 11:59 PM, Tuesday. This may not apply to medications prescribed by your physician.  (Please note below the special instructions with medications to take the morning of surgery, if applicable.)    4. We recommend you do not drink any alcoholic beverages for 24 hours before and after your surgery.    5. Contact your surgeon’s office for instructions on the following medications: non-steroidal anti-inflammatory drugs (i.e. Advil, Aleve), vitamins, and supplements. (Some surgeon’s will want you to stop these medications prior to surgery and others may allow you to take them)   **If you are currently taking Plavix, Coumadin, Aspirin and/or other blood-thinning agents, contact your surgeon for instructions.** Your surgeon will partner with the physician prescribing these medications to determine if it is safe to stop or if you need to continue taking.

## 2024-05-21 ENCOUNTER — ANESTHESIA EVENT (OUTPATIENT)
Facility: HOSPITAL | Age: 84
End: 2024-05-21
Payer: MEDICARE

## 2024-05-22 ENCOUNTER — ANESTHESIA (OUTPATIENT)
Facility: HOSPITAL | Age: 84
End: 2024-05-22
Payer: MEDICARE

## 2024-05-22 ENCOUNTER — HOSPITAL ENCOUNTER (OUTPATIENT)
Facility: HOSPITAL | Age: 84
Setting detail: OUTPATIENT SURGERY
Discharge: HOME | End: 2024-05-22
Attending: ORTHOPAEDIC SURGERY | Admitting: ORTHOPAEDIC SURGERY
Payer: MEDICARE

## 2024-05-22 VITALS
HEIGHT: 74 IN | BODY MASS INDEX: 24.13 KG/M2 | SYSTOLIC BLOOD PRESSURE: 129 MMHG | RESPIRATION RATE: 17 BRPM | OXYGEN SATURATION: 99 % | HEART RATE: 47 BPM | DIASTOLIC BLOOD PRESSURE: 47 MMHG | TEMPERATURE: 97.3 F | WEIGHT: 188 LBS

## 2024-05-22 PROBLEM — M67.442 GANGLION OF LEFT HAND: Status: ACTIVE | Noted: 2024-05-22

## 2024-05-22 PROCEDURE — 2580000003 HC RX 258: Performed by: ORTHOPAEDIC SURGERY

## 2024-05-22 PROCEDURE — 3600000002 HC SURGERY LEVEL 2 BASE: Performed by: ORTHOPAEDIC SURGERY

## 2024-05-22 PROCEDURE — 2500000003 HC RX 250 WO HCPCS: Performed by: ORTHOPAEDIC SURGERY

## 2024-05-22 PROCEDURE — 3600000012 HC SURGERY LEVEL 2 ADDTL 15MIN: Performed by: ORTHOPAEDIC SURGERY

## 2024-05-22 PROCEDURE — 6360000002 HC RX W HCPCS: Performed by: ORTHOPAEDIC SURGERY

## 2024-05-22 PROCEDURE — 2500000003 HC RX 250 WO HCPCS: Performed by: NURSE ANESTHETIST, CERTIFIED REGISTERED

## 2024-05-22 PROCEDURE — 88304 TISSUE EXAM BY PATHOLOGIST: CPT

## 2024-05-22 PROCEDURE — 6360000002 HC RX W HCPCS: Performed by: NURSE ANESTHETIST, CERTIFIED REGISTERED

## 2024-05-22 PROCEDURE — 7100000010 HC PHASE II RECOVERY - FIRST 15 MIN: Performed by: ORTHOPAEDIC SURGERY

## 2024-05-22 PROCEDURE — 2709999900 HC NON-CHARGEABLE SUPPLY: Performed by: ORTHOPAEDIC SURGERY

## 2024-05-22 PROCEDURE — 3700000000 HC ANESTHESIA ATTENDED CARE: Performed by: ORTHOPAEDIC SURGERY

## 2024-05-22 PROCEDURE — 3700000001 HC ADD 15 MINUTES (ANESTHESIA): Performed by: ORTHOPAEDIC SURGERY

## 2024-05-22 PROCEDURE — 26160 REMOVE TENDON SHEATH LESION: CPT | Performed by: ORTHOPAEDIC SURGERY

## 2024-05-22 PROCEDURE — 2580000003 HC RX 258: Performed by: ANESTHESIOLOGY

## 2024-05-22 RX ORDER — SODIUM CHLORIDE, SODIUM LACTATE, POTASSIUM CHLORIDE, CALCIUM CHLORIDE 600; 310; 30; 20 MG/100ML; MG/100ML; MG/100ML; MG/100ML
INJECTION, SOLUTION INTRAVENOUS CONTINUOUS
Status: DISCONTINUED | OUTPATIENT
Start: 2024-05-22 | End: 2024-05-22 | Stop reason: HOSPADM

## 2024-05-22 RX ORDER — NALOXONE HYDROCHLORIDE 0.4 MG/ML
INJECTION, SOLUTION INTRAMUSCULAR; INTRAVENOUS; SUBCUTANEOUS PRN
Status: DISCONTINUED | OUTPATIENT
Start: 2024-05-22 | End: 2024-05-22 | Stop reason: HOSPADM

## 2024-05-22 RX ORDER — DEXMEDETOMIDINE HYDROCHLORIDE 100 UG/ML
INJECTION, SOLUTION INTRAVENOUS PRN
Status: DISCONTINUED | OUTPATIENT
Start: 2024-05-22 | End: 2024-05-22 | Stop reason: SDUPTHER

## 2024-05-22 RX ORDER — OXYCODONE HYDROCHLORIDE 5 MG/1
10 TABLET ORAL PRN
Status: DISCONTINUED | OUTPATIENT
Start: 2024-05-22 | End: 2024-05-22 | Stop reason: HOSPADM

## 2024-05-22 RX ORDER — ONDANSETRON 2 MG/ML
4 INJECTION INTRAMUSCULAR; INTRAVENOUS
Status: DISCONTINUED | OUTPATIENT
Start: 2024-05-22 | End: 2024-05-22 | Stop reason: HOSPADM

## 2024-05-22 RX ORDER — SODIUM CHLORIDE 0.9 % (FLUSH) 0.9 %
5-40 SYRINGE (ML) INJECTION PRN
Status: DISCONTINUED | OUTPATIENT
Start: 2024-05-22 | End: 2024-05-22 | Stop reason: HOSPADM

## 2024-05-22 RX ORDER — OXYCODONE HYDROCHLORIDE 5 MG/1
5 TABLET ORAL PRN
Status: DISCONTINUED | OUTPATIENT
Start: 2024-05-22 | End: 2024-05-22 | Stop reason: HOSPADM

## 2024-05-22 RX ORDER — WATER 10 ML/10ML
INJECTION INTRAMUSCULAR; INTRAVENOUS; SUBCUTANEOUS
Status: DISCONTINUED
Start: 2024-05-22 | End: 2024-05-22 | Stop reason: HOSPADM

## 2024-05-22 RX ORDER — MEPERIDINE HYDROCHLORIDE 25 MG/ML
12.5 INJECTION INTRAMUSCULAR; INTRAVENOUS; SUBCUTANEOUS EVERY 5 MIN PRN
Status: DISCONTINUED | OUTPATIENT
Start: 2024-05-22 | End: 2024-05-22 | Stop reason: HOSPADM

## 2024-05-22 RX ORDER — SODIUM CHLORIDE 9 MG/ML
INJECTION, SOLUTION INTRAVENOUS PRN
Status: DISCONTINUED | OUTPATIENT
Start: 2024-05-22 | End: 2024-05-22 | Stop reason: HOSPADM

## 2024-05-22 RX ORDER — CEFAZOLIN SODIUM 1 G/3ML
INJECTION, POWDER, FOR SOLUTION INTRAMUSCULAR; INTRAVENOUS
Status: DISCONTINUED
Start: 2024-05-22 | End: 2024-05-22 | Stop reason: HOSPADM

## 2024-05-22 RX ORDER — DROPERIDOL 2.5 MG/ML
0.62 INJECTION, SOLUTION INTRAMUSCULAR; INTRAVENOUS
Status: DISCONTINUED | OUTPATIENT
Start: 2024-05-22 | End: 2024-05-22 | Stop reason: HOSPADM

## 2024-05-22 RX ORDER — KETOROLAC TROMETHAMINE 30 MG/ML
15 INJECTION, SOLUTION INTRAMUSCULAR; INTRAVENOUS
Status: DISCONTINUED | OUTPATIENT
Start: 2024-05-22 | End: 2024-05-22 | Stop reason: HOSPADM

## 2024-05-22 RX ORDER — SODIUM CHLORIDE 0.9 % (FLUSH) 0.9 %
5-40 SYRINGE (ML) INJECTION EVERY 12 HOURS SCHEDULED
Status: DISCONTINUED | OUTPATIENT
Start: 2024-05-22 | End: 2024-05-22 | Stop reason: HOSPADM

## 2024-05-22 RX ORDER — FENTANYL CITRATE 50 UG/ML
25 INJECTION, SOLUTION INTRAMUSCULAR; INTRAVENOUS EVERY 5 MIN PRN
Status: DISCONTINUED | OUTPATIENT
Start: 2024-05-22 | End: 2024-05-22 | Stop reason: HOSPADM

## 2024-05-22 RX ORDER — ACETAMINOPHEN 500 MG
1000 TABLET ORAL
Status: DISCONTINUED | OUTPATIENT
Start: 2024-05-22 | End: 2024-05-22 | Stop reason: HOSPADM

## 2024-05-22 RX ORDER — LIDOCAINE HYDROCHLORIDE 20 MG/ML
INJECTION, SOLUTION EPIDURAL; INFILTRATION; INTRACAUDAL; PERINEURAL PRN
Status: DISCONTINUED | OUTPATIENT
Start: 2024-05-22 | End: 2024-05-22 | Stop reason: SDUPTHER

## 2024-05-22 RX ORDER — LIDOCAINE HYDROCHLORIDE 10 MG/ML
1 INJECTION, SOLUTION EPIDURAL; INFILTRATION; INTRACAUDAL; PERINEURAL
Status: DISCONTINUED | OUTPATIENT
Start: 2024-05-22 | End: 2024-05-22 | Stop reason: HOSPADM

## 2024-05-22 RX ADMIN — LIDOCAINE HYDROCHLORIDE 100 MG: 20 INJECTION, SOLUTION EPIDURAL; INFILTRATION; INTRACAUDAL; PERINEURAL at 10:40

## 2024-05-22 RX ADMIN — SODIUM CHLORIDE, POTASSIUM CHLORIDE, SODIUM LACTATE AND CALCIUM CHLORIDE: 600; 310; 30; 20 INJECTION, SOLUTION INTRAVENOUS at 09:35

## 2024-05-22 RX ADMIN — WATER 2000 MG: 1 INJECTION INTRAMUSCULAR; INTRAVENOUS; SUBCUTANEOUS at 10:43

## 2024-05-22 RX ADMIN — PROPOFOL 125 MCG/KG/MIN: 10 INJECTION, EMULSION INTRAVENOUS at 10:40

## 2024-05-22 RX ADMIN — DEXMEDETOMIDINE HYDROCHLORIDE 5 MCG: 100 INJECTION, SOLUTION, CONCENTRATE INTRAVENOUS at 10:35

## 2024-05-22 ASSESSMENT — PAIN - FUNCTIONAL ASSESSMENT: PAIN_FUNCTIONAL_ASSESSMENT: 0-10

## 2024-05-22 NOTE — OP NOTE
[facility]              [address] [place]  [address]                            OPERATIVE REPORT      PATIENT NAME: AMRIT RIOS JR         : 1940  MED REC NO: [id number]                       ROOM: [facility]  ACCOUNT NO: [id number]                       ADMIT DATE: 2024  PROVIDER: Lowell Reynolds MD    DATE OF SERVICE:  2024    PREOPERATIVE DIAGNOSES:  Left hand dorsal ganglion cyst, nearest 2nd webspace.    POSTOPERATIVE DIAGNOSES:  Left hand dorsal ganglion cyst, nearest 2nd webspace.    PROCEDURES PERFORMED:  Excision of left hand dorsal ganglion cyst.    SURGEON:  Lowell Reynolds MD    ASSISTANT:  None.    ANESTHESIA:  Monitored anesthesia care.    ESTIMATED BLOOD LOSS:  Less than 5 mL.    SPECIMENS REMOVED:  Ganglion cyst.    INTRAOPERATIVE FINDINGS:  ganglion cyst     COMPLICATIONS:  None.    IMPLANTS:  None.    INDICATIONS:  The patient is an 84-year-old right-hand dominant gentleman, who has developed a cyst between the index and middle metacarpal heads nears the 2nd dorsal webspace, approximately 1 cm in size, minimally mobile, but noninfectious in appearance.  He elects to have this cyst surgically removed at this time.    DESCRIPTION OF PROCEDURE:  The patient was identified, taken into the operating room, and placed supine on the operating room table.  He received intravenous sedation and monitored care from anesthesia.  His left upper extremity was prepped and draped sterilely.  10 mL of 0.25% Marcaine and 1% lidocaine in a 50:50 mixture was utilized for local anesthesia after Esmarch exsanguination.  The tourniquet was inflated to 250 mmHg.  A 1 to 1.5 cm longitudinal incision was made over the cystic region located just at and proximal to the 2nd webspace.  Skin flaps were elevated.  Veins were encountered and retracted where needed.  The cyst was quickly exposed.  This appeared to be a typical clear gelatinous fluid filled cyst

## 2024-05-22 NOTE — ANESTHESIA POSTPROCEDURE EVALUATION
Department of Anesthesiology  Postprocedure Note    Patient: Marshal Bowers Jr  MRN: [id number]  YOB: 1940  Date of evaluation: 5/22/2024    Procedure Summary       Date: 05/22/24 Room / Location: [facility] ASU  / [facility] AMBULATORY OR    Anesthesia Start: 1037 Anesthesia Stop: 1109    Procedure: LEFT HAND CYST EXCISION (Left: Hand) Diagnosis:       Ganglion of left hand      (Ganglion of left hand [M67.442])    Surgeons: Lowell Reynolds MD Responsible Provider: Elizabeth Rosenthal MD    Anesthesia Type: TIVA, MAC ASA Status: 2            Anesthesia Type: TIVA, MAC    Julia Phase I: Julia Score: 10    Julia Phase II:      Anesthesia Post Evaluation    Patient location during evaluation: PACU  Patient participation: complete - patient participated  Level of consciousness: awake and alert  Pain score: 0  Airway patency: patent  Nausea & Vomiting: no nausea and no vomiting  Cardiovascular status: blood pressure returned to baseline and hemodynamically stable  Respiratory status: acceptable  Hydration status: euvolemic  Multimodal analgesia pain management approach  Pain management: satisfactory to patient    No notable events documented.

## 2024-05-22 NOTE — DISCHARGE INSTRUCTIONS
DISCHARGE INSTRUCTIONS - SHILO DAVIS MD    DIET: You may resume your regular diet. It is common to experience some nausea after surgery, so you are encouraged to start with light, bland foods.     MEDICATION: A prescription for postoperative pain relief was sent to your provided pharmacy. Take the pain medication as prescribed, preferably with food. You may also take ibuprofen or an equivalent (for example, Motrin, Advil, Aleve) in between pain medication for break-through pain as needed. Tylenol may be taken minimally as an alternative if you are not able to take ibuprofen products. If antibiotics are prescribed, be sure to complete the medication as prescribed. While taking pain medication, drink plenty of fluids and eat foods high in fiber to help prevent constipation, a common side effect of pain medication.     ACTIVITY:     * Elevate your arm above the level of the heart as often as you can throughout the day for the first 72 hours. You may continue to elevate if your swelling continues after 72 hours.    * Exercise fingers to avoid stiffness, by gently opening and closing them, unless the fingers are bandaged.     * Do not lift anything with your postoperative arm immediately after surgery. Avoid lifting more than 5 pounds until you have been cleared by Dr. Davis.    INCISION:             * Keep incision covered and dry until your first postoperative appointment.            * An ice bag/pack may be applied to your hand/arm for pain and swelling. Apply the ice for  20 minute increments as often as you need to throughout the day. Do not apply ice directly to your skin. Pain and swelling is normal after your surgery and may continue for 3-5 days and in some cases a bit longer.     DRESSINGS:     Casts and splinted dressings (half hard/half soft):           * Keep on until removed at your first postoperative office visit.           * Keep clean and dry. If the cast or splint accidentally gets wet, use a hair  NARCOTIC PAIN MEDICATIONS WITH FOOD     Narcotics tend to be constipating, we suggest taking a stool softener such as Colace or Miralax (follow package instructions).    DO NOT DRIVE WHILE TAKING NARCOTIC PAIN MEDICATIONS.    DO NOT TAKE SLEEPING MEDICATIONS OR ANTIANXIETY MEDICATIONS WHILE TAKING NARCOTIC PAIN MEDICATIONS,  ESPECIALLY THE NIGHT OF ANESTHESIA!    CPAP PATIENTS BE SURE TO WEAR MACHINE WHENEVER NAPPING OR SLEEPING!    PATIENT INSTRUCTIONS:    After General Anesthesia or Intravenous Sedation, for 24 hours or while taking prescription Narcotics:        Someone should be with you for the next 24 hours.        For your own safety, a responsible adult must drive you home.  Limit your activities  Recommended activity: Rest today, up with assistance today. Do not climb stairs or shower unattended for the next 24 hours.  Please start with a soft bland diet and advance as tolerated (no nausea) to regular diet.  If you have a sore throat you should try the following: fluids, warm salt water gargles, or throat lozenges. If it does not improve after several days please follow up with your primary physician.  Do not drive and operate hazardous machinery  Do not make important personal or business decisions  Do  not drink alcoholic beverages  If you have not urinated within 8 hours after discharge, please contact your surgeon on call.    Report the following to your surgeon:  Excessive pain, swelling, redness or odor of or around the surgical area  Temperature over 100.5  Nausea and vomiting lasting longer than 4 hours or if unable to take medications  Any signs of decreased circulation or nerve impairment to extremity: change in color, persistent  numbness, tingling, coldness or increase pain      You will receive a Post Operative Call from one of the Recovery Room Nurses on the day after your surgery to check on you. It is very important for us to know how you are recovering after your surgery. If you have an

## 2024-05-22 NOTE — BRIEF OP NOTE
Brief Postoperative Note      Patient: Marshal Bowers Jr  YOB: 1940  MRN: [id number]    Date of Procedure: 5/22/2024    Pre-Op Diagnosis Codes:     * Ganglion of left hand [M67.442]    Post-Op Diagnosis: Same       Procedure(s):  LEFT HAND CYST EXCISION    Surgeon(s):  Lowell Reynolds MD    Assistant:  * No surgical staff found *    Anesthesia: Monitor Anesthesia Care    Estimated Blood Loss (mL): Minimal    Complications: None    Specimens:   ID Type Source Tests Collected by Time Destination   1 : Left hand cyst Tissue Hand SURGICAL PATHOLOGY Lowell Reynolds MD 5/22/2024 1054        Implants:  * No implants in log *      Drains: * No LDAs found *    Findings:  Infection Present At Time Of Surgery (PATOS) (choose all levels that have infection present):  No infection present  Other Findings: cyst  This procedure was not performed to treat primary cutaneous melanoma through wide local excision    Electronically signed by Lowell Reynolds MD on 5/22/2024 at 11:09 AM

## 2024-05-22 NOTE — H&P
Orthopaedic Hand Surgery History and Physical    Subjective:   CC:  Ganglion of left hand [M67.442]    HPI:  Marshal Bowers Jr is a 84 y.o. year old male who presents with Ganglion of left hand [M67.442] for surgical removal left dorsal hand cyst.    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    Essential hypertension, benign 3/31/2010    GERD (gastroesophageal reflux disease)     Hypercholesterolemia     Other and unspecified hyperlipidemia 3/31/2010    Personal history of colonic polyps 3/31/2010    Personal history of MI (myocardial infarction) 3/31/2010    Stented coronary artery 3/31/2010      Past Surgical History:   Past Surgical History:   Procedure Laterality Date    APPENDECTOMY  2016    CATARACT REMOVAL Bilateral     COLONOSCOPY  ,    CORONARY ANGIOPLASTY WITH STENT PLACEMENT      DENTAL SURGERY      HIP ARTHROPLASTY Left     TONSILLECTOMY      AS A CHILD     Family History:   Family History   Problem Relation Age of Onset    Cancer Mother         BRAIN TUMOR    Anesth Problems Neg Hx     Cancer Sister         UTERINE    No Known Problems Sister     Heart Disease Father     Rheum Arthritis Sister       Social History:   Social History     Tobacco Use    Smoking status: Former     Current packs/day: 0.00     Types: Cigarettes     Quit date: 1980     Years since quittin.0    Smokeless tobacco: Never   Substance Use Topics    Alcohol use: Yes     Alcohol/week: 14.0 standard drinks of alcohol       Home Medications:   Prior to Admission medications    Medication Sig Start Date End Date Taking? Authorizing Provider   aspirin 325 MG tablet Take 1 tablet by mouth daily   Yes Provider,  MD Praveen   ibuprofen (ADVIL;MOTRIN) 800 MG tablet Take 1 tablet by mouth every 8 hours as needed for Pain 5/21/24   Serenity Whitlock PA-C   amLODIPine (NORVASC) 5 MG tablet Take by mouth daily    Automatic Reconciliation, Ar   hydroCHLOROthiazide (MICROZIDE) 12.5 MG capsule Take by mouth daily 2/19/13   Automatic Reconciliation, Ar   lisinopril (PRINIVIL;ZESTRIL) 40 MG tablet Take by mouth daily 2/19/13   Automatic Reconciliation, Ar   metoprolol succinate (TOPROL XL) 50 MG extended release tablet Take by mouth daily 2/19/13   Automatic Reconciliation, Ar   nitroGLYCERIN (NITROSTAT) 0.4 MG SL tablet Place under the tongue    Automatic Reconciliation, Ar   rosuvastatin (CRESTOR) 20 MG tablet Take by mouth daily    Automatic Reconciliation, Ar   senna-docusate (PERICOLACE) 8.6-50 MG per tablet Take 1 tablet by mouth 2 times daily as needed 4/1/21   Automatic Reconciliation, Ar     Allergies: No Known Allergies     Review of Systems:  A comprehensive review of systems was negative except for that written in the History of Present Illness.     Objective:         Physical Exam:     Vitals: Blood pressure (!) 187/95, pulse 59, temperature 97.3 °F (36.3 °C), temperature source Temporal, resp. rate 16, height 1.88 m (6' 2\"), weight 85.3 kg (188 lb), SpO2 98 %.  General:  Awake and alert  HEENT:  NCAT, neck supple without lymphadenopathy  Lungs:  CTA bilaterally  CV:  RRR  Abd:  Soft, non-tender, non-distended  Ext's: left 1cm dorsal hand cyst nearest 2nd web space.  Neuro:  A&O x 3      Data Review:   No results found for this or any previous visit (from the past 24 hour(s)).    Assessment:     Active Problems:    Ganglion of left hand  Resolved Problems:    * No resolved hospital problems. *      Plan:     To OR to remove left dorsal hand cyst.    Signed By: Lowell Reynolds MD     May 22, 2024

## 2024-05-22 NOTE — PERIOP NOTE
Permission received to review discharge instructions and discuss private health information with wife and will have someone with them after discharge   Patient states that family/friend will be with them for at least 24 hours following today's procedure.   Air Warming blanket placed on pt; turned on for comfort

## 2024-05-22 NOTE — ANESTHESIA PRE PROCEDURE
Elizabeth Rosenthal MD       • 0.9 % sodium chloride infusion   IntraVENous PRN Elizabeth Rosenthal MD       • ceFAZolin (ANCEF) 1 g injection            • sterile water injection                Allergies:  No Known Allergies    Problem List:    Patient Active Problem List   Diagnosis Code   • Stented coronary artery Z95.5   • Tubular adenoma of colon D12.6   • Personal history of colonic polyps Z86.010   • Personal history of MI (myocardial infarction) I25.2   • Primary localized osteoarthritis of left hip M16.12   • Essential hypertension, benign I10   • ASCVD (arteriosclerotic cardiovascular disease) I25.10   • DJD of shoulder M19.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       Past Medical History:        Diagnosis Date   • Allergic rhinitis, cause unspecified 3/31/2010   • ASCVD (arteriosclerotic cardiovascular disease) 3/31/2010   • CAD (coronary artery disease)    • Cancer (HCC)     BASAL CELL ON RIGHT EAR   • Diverticulosis of colon (without mention of hemorrhage) 3/31/2010   • 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   • Essential hypertension, benign 3/31/2010   • GERD (gastroesophageal reflux disease)    • Hypercholesterolemia    • Other and unspecified hyperlipidemia 3/31/2010   • Personal history of colonic polyps 3/31/2010   • Personal history of MI (myocardial infarction) 3/31/2010   • Stented coronary artery 3/31/2010       Past Surgical History:        Procedure Laterality Date   • APPENDECTOMY     • CATARACT REMOVAL Bilateral    • COLONOSCOPY  ,   • CORONARY ANGIOPLASTY WITH STENT PLACEMENT     • DENTAL SURGERY     • HIP ARTHROPLASTY Left    • TONSILLECTOMY      AS A CHILD       Social History:    Social History     Tobacco Use   • Smoking status: Former     Current packs/day: 0.00     Types: Cigarettes     Quit date: 1980     Years since quittin.0   • Smokeless tobacco: Never   Substance Use Topics   • Alcohol use: Yes     Alcohol/week: 14.0 standard drinks of alcohol                                Counseling given: Not

## (undated) DEVICE — REM POLYHESIVE ADULT PATIENT RETURN ELECTRODE: Brand: VALLEYLAB

## (undated) DEVICE — Device

## (undated) DEVICE — STERILE POLYISOPRENE POWDER-FREE SURGICAL GLOVES WITH EMOLLIENT COATING: Brand: PROTEXIS

## (undated) DEVICE — T4 HOOD

## (undated) DEVICE — SUTURE STRATAFIX SPRL SZ 1 L14IN ABSRB VLT L48CM CTX 1/2 SXPD2B405

## (undated) DEVICE — SUTURE ETHBND EXCEL SZ 2 L30IN NONABSORBABLE GRN L40MM V-37 MX69G

## (undated) DEVICE — SOL IRR STRL H2O 1000ML BTL --

## (undated) DEVICE — COVER,MAYO STAND,STERILE: Brand: MEDLINE

## (undated) DEVICE — CONTAINER,SPECIMEN,3OZ,OR STRL: Brand: MEDLINE

## (undated) DEVICE — STRAP,POSITIONING,KNEE/BODY,FOAM,4X60": Brand: MEDLINE

## (undated) DEVICE — NEEDLE HYPO 21GA L1.5IN INTRAMUSCULAR S STL LATCH BVL UP

## (undated) DEVICE — SYR 20ML LL STRL LF --

## (undated) DEVICE — SUTURE MCRYL SZ 3-0 L27IN ABSRB UD L24MM PS-1 3/8 CIR PRIM Y936H

## (undated) DEVICE — SCRUB DRY SURG EZ SCRUB BRUSH PREOPERATIVE GRN

## (undated) DEVICE — STERILE POLYISOPRENE POWDER-FREE SURGICAL GLOVES: Brand: PROTEXIS

## (undated) DEVICE — BANDAGE,GAUZE,BULKEE II,2.25"X3YD,STRL: Brand: MEDLINE

## (undated) DEVICE — SPONGE GZ W4XL4IN COT 12 PLY TYP VII WVN C FLD DSGN

## (undated) DEVICE — SUTURE VCRL SZ 0 L36IN ABSRB VLT L40MM CT 1/2 CIR J358H

## (undated) DEVICE — Z DUP USE 2275493 DRESSING ALGINATE POST OPERATIVE 10X3.5 IN RECT PRIMASEAL

## (undated) DEVICE — DERMABOND SKIN ADH 0.7ML -- DERMABOND ADVANCED 12/BX

## (undated) DEVICE — CORD ES L12FT BPLR FRCP

## (undated) DEVICE — ZIMMER® STERILE DISPOSABLE TOURNIQUET CUFF WITH PLC, DUAL PORT, SINGLE BLADDER, 18 IN. (46 CM)

## (undated) DEVICE — SUTURE VCRL SZ 1 L36IN ABSRB UD L36MM CT-1 1/2 CIR J947H

## (undated) DEVICE — ELECTRODE BLDE L4IN NONINSULATED EDGE

## (undated) DEVICE — SUTURE NONABSORBABLE MONOFILAMENT 4-0 PS-2 18 IN BLK ETHILON 1667G

## (undated) DEVICE — SOLUTION IRRIG 1000ML 0.9% SOD CHL USP POUR PLAS BTL

## (undated) DEVICE — PREP SKN CHLRAPRP APL 26ML STR --

## (undated) DEVICE — ALCOHOL RUBBING ISO 16OZ 70%

## (undated) DEVICE — PATIENT PROTECTIVE PAD FOR IMP UNIVERSAL LATERAL HIP POSITIONER (ULP) (6/CASE): Brand: PATIENT PROTECTIVE PAD

## (undated) DEVICE — DECANTER BAG 9": Brand: MEDLINE INDUSTRIES, INC.

## (undated) DEVICE — YANKAUER,OPEN TIP,W/O VENT,STERILE: Brand: MEDLINE INDUSTRIES, INC.

## (undated) DEVICE — 3M™ STERI-DRAPE™ 2 INCISE DRAPE 2050: Brand: STERI-DRAPE™

## (undated) DEVICE — HANDPIECE SET WITH BONE CLEANING TIP AND SUCTION TUBE: Brand: INTERPULSE

## (undated) DEVICE — GLOVE ORANGE PI 8   MSG9080

## (undated) DEVICE — TIP SUCT CRV REG REDI

## (undated) DEVICE — SYRINGE MED 10ML LUERLOCK TIP W/O SFTY DISP

## (undated) DEVICE — GOWN,PREVENTION PLUS,XLN/2XL,ST,22/CS: Brand: MEDLINE

## (undated) DEVICE — SOLUTION SURG PREP 26 CC PURPREP

## (undated) DEVICE — PAD BD MATTRESS 73X32 IN STD CONVOLUTED FOAM LTX FREE

## (undated) DEVICE — 3M™ IOBAN™ 2 ANTIMICROBIAL INCISE DRAPE 6651EZ: Brand: IOBAN™ 2

## (undated) DEVICE — BIT DRL L5IN DIA2MM STD ST S STL TWST BUSA

## (undated) DEVICE — DRAPE,U/ SHT,SPLIT,PLAS,STERIL: Brand: MEDLINE

## (undated) DEVICE — SUTURE VCRL SZ 2-0 L36IN ABSRB UD L40MM CT 1/2 CIR J957H

## (undated) DEVICE — TOTAL TRAY, 16FR 10ML SIL FOLEY, URN: Brand: MEDLINE

## (undated) DEVICE — BLADE SAW W098XL354IN THK0047IN CUT THK0047IN SAG

## (undated) DEVICE — BANDAGE COMPR W3INXL5YD WHT BGE POLY COT M E WRP WV HK AND

## (undated) DEVICE — HEWSON SUTURE RETRIEVER: Brand: HEWSON SUTURE RETRIEVER

## (undated) DEVICE — SOLUTION IV 50ML 0.9% SOD CHL

## (undated) DEVICE — HAND-MRMCASU: Brand: MEDLINE INDUSTRIES, INC.

## (undated) DEVICE — INFECTION CONTROL KIT SYS

## (undated) DEVICE — SOL IRR SOD CL 0.9% 3000ML --